# Patient Record
Sex: FEMALE | Race: WHITE | Employment: OTHER | URBAN - METROPOLITAN AREA
[De-identification: names, ages, dates, MRNs, and addresses within clinical notes are randomized per-mention and may not be internally consistent; named-entity substitution may affect disease eponyms.]

---

## 2018-03-08 ENCOUNTER — HOSPITAL ENCOUNTER (OUTPATIENT)
Dept: MAMMOGRAPHY | Age: 68
Discharge: HOME | End: 2018-03-08
Attending: FAMILY MEDICINE
Payer: MEDICARE

## 2018-03-08 DIAGNOSIS — M85.80 OSTEOPENIA: ICD-10-CM

## 2018-03-08 PROCEDURE — 77080 DXA BONE DENSITY AXIAL: CPT

## 2019-03-06 ENCOUNTER — HOSPITAL ENCOUNTER (OUTPATIENT)
Dept: CT IMAGING | Age: 69
Discharge: HOME | End: 2019-03-06
Attending: FAMILY MEDICINE
Payer: SELF-PAY

## 2019-03-06 DIAGNOSIS — Z87.891 PERSONAL HISTORY OF TOBACCO USE, PRESENTING HAZARDS TO HEALTH: ICD-10-CM

## 2019-03-06 PROCEDURE — G0297 LDCT FOR LUNG CA SCREEN: HCPCS

## 2020-01-17 ENCOUNTER — HOSPITAL ENCOUNTER (OUTPATIENT)
Dept: MRI IMAGING | Age: 70
Discharge: HOME | End: 2020-01-17
Attending: FAMILY MEDICINE
Payer: MEDICARE

## 2020-01-17 DIAGNOSIS — M19.90 DEGENERATIVE JOINT DISEASE: ICD-10-CM

## 2020-01-17 DIAGNOSIS — M43.10 SPONDYLOLISTHESIS, GRADE 2: ICD-10-CM

## 2020-01-17 PROCEDURE — 72148 MRI LUMBAR SPINE W/O DYE: CPT

## 2020-01-30 ENCOUNTER — OFFICE VISIT (OUTPATIENT)
Dept: RHEUMATOLOGY | Age: 70
End: 2020-01-30

## 2020-01-30 VITALS
SYSTOLIC BLOOD PRESSURE: 120 MMHG | HEIGHT: 65 IN | WEIGHT: 129.2 LBS | RESPIRATION RATE: 18 BRPM | TEMPERATURE: 98.2 F | DIASTOLIC BLOOD PRESSURE: 78 MMHG | HEART RATE: 112 BPM | OXYGEN SATURATION: 97 % | BODY MASS INDEX: 21.52 KG/M2

## 2020-01-30 DIAGNOSIS — M47.817 LUMBAR AND SACRAL OSTEOARTHRITIS: Primary | ICD-10-CM

## 2020-01-30 DIAGNOSIS — R76.8 POSITIVE ANA (ANTINUCLEAR ANTIBODY): ICD-10-CM

## 2020-01-30 DIAGNOSIS — R70.0 ELEVATED SED RATE: ICD-10-CM

## 2020-01-30 RX ORDER — GLUCOSAMINE/CHONDR SU A SOD 750-600 MG
TABLET ORAL
COMMUNITY

## 2020-01-30 RX ORDER — MELATONIN
DAILY
COMMUNITY

## 2020-01-30 NOTE — PATIENT INSTRUCTIONS
Please fill out your [address] you will receive after your visit in the mail or via [address] Positive JOSE does NOT equal Lupus. You do not have a clinical history or examination consistent with an autoimmune disease, such as systemic lupus erythematosus, at this time. Low positive JOSE 1:40 and 1:160 may be positive in up to 32% and 5%, respectively, in the normal population [facility] et al. Arthritis Res Ther. 2008;10(6):R131. Epub 2008 Nov 11). A history of thyroid disease in the family or thyroid disease in the patient can also cause a positive JOSE. Viral infections, malignancy and medications can also cause a positive JOSE. Anti-RNP has been found to false positives using [facility], therefore clinical correlation is required. Diagnosing systemic lupus is complicated in the sense that you need to look at the entire picture of a patient's presentation and their laboratory workup. In this video, Dr. Frank Moser with the [facility], discusses the process of diagnosing Lupus. [email or web address]

## 2020-01-30 NOTE — PROGRESS NOTES
REASON FOR VISIT    This is the initial evaluation for Ms. Linnea Garcia a 81 y.o.  female for question of positive JOSE. The patient is referred to the [facility] at the request of PCP. HISTORY OF PRESENT ILLNESS     Previous medical records reviewed and summarized: yes    mHAQ:0  Pain scale: 10/10    This is a 81 y.o. female with hx of COPD. The patient notes she has had chronic back issues for a long time. The patient's ESR is going up and down as well. She also has a positive JOSE. Per the patient she had had a positive JOSE since she was 22. The patient notes she has had lower back pain for years. She has had x-rays which show DJD. She had an MRI recently which showed DJD as well. The pain is there all the time but the severity varies. If she cleans the house she gets pain. Strenuous activities makes the pain worse. Rest doesn't always help. The pain doesn't wake her up from sleep. She has stiffness in the morning for a hour or so but mostly the pain gets worse by the end of the day. She uses tramadol when she takes it. She can go for Days without taking anything. Now she is having some problem with some tingling in her left foot. She has done PT, heat etc.  Sometimes when she does PT, it helps. REVIEW OF SYSTEMS    A 15 point review of systems was performed and summarized below. The questionnaire was reviewed with the patient and scanned into the patient's medical record.     General:  denies recent weight gain, recent weight loss, fatigue, weakness, fever, drenching night sweats  Musculoskeletal:  denies joint pain, joint swelling, morning stiffness , muscle pain  Ears: denies ringing in ears, hearing loss, deafness  Eyes: denies pain, light sensitive, redness, blindness, double vision, blurred vision, excess tearing, dryness, foreign body sensation  Mouth: denies sore tongue, oral ulcers, loss of taste, dryness, increased dental caries  Nose: denies nosebleeds, nasal ulcers  Throat: denies food stuck when swallowing, difficulty with swallowing, hoarseness, pain in jaw while chewing  Neck: denies swollen glands, tender glands  Cardiopulmonary: endorses  shortness of breath on exertion, denies pain in chest with deep breaths, pain in chest when lying down, murmurs, sudden changes in heart beat, wheezing, dry cough, productive cough, shortness of breath at rest,coughing of blood  Gastrointestinal: denies nausea, heartburn, stomach pain relieved by food, chronic constipation, chronic diarrhea, blood in stools, black stools  Genitourinary: denies vaginal dryness, pain or burning on urination, blood in urine, cloudy urine, vaginal ulcers, penile ulcers  Hematologic: denies anemia, bleeding tendency, blood clots, bleeding gums  Skin: denies easy bruising, hair loss, rash, rash worsened after sun exposure, hives/urticaria, skin thickening, skin tightness, nodules/bumps, color changes of hands or feet in the cold (Raynaud's)  Neurologic: endorses numbness or tingling in feet, denies numbness or tingling in hands, muscle weakness  Psychiatric: denies depression, excessive worries, PTSD, Bipolar  Sleep: endorses poor sleep (3-4 hours), difficulty falling asleep, difficulty staying asleep denies  denies snoring, apnea, daytime somnolence,     PAST MEDICAL HISTORY    Past Medical History:   Diagnosis Date    Fracture     Right foot fx; patient fell    IBS (irritable bowel syndrome)     Psychiatric disorder     depression, anxiety        Past Surgical History:   Procedure Laterality Date    HX CHOLECYSTECTOMY      HX GYN      hysterectomy       FAMILY HISTORY    History reviewed. No pertinent family history.     SOCIAL HISTORY    Social History     Tobacco Use    Smoking status: Former Smoker     Packs/day: 1.00    Smokeless tobacco: Never Used   Substance Use Topics    Alcohol use: No    Drug use: Never       MEDICATIONS    Current Outpatient Medications   Medication Sig Dispense Refill    AZITHROMYCIN PO Take  by mouth.  cholecalciferol (VITAMIN D3) (1000 Units /25 mcg) tablet Take  by mouth daily.  Biotin 2,500 mcg cap Take  by mouth.  lurasidone (LATUDA) 40 mg tab tablet Take 20 mg by mouth daily (with dinner).  levothyroxine (SYNTHROID) 75 mcg tablet Take 75 mcg by mouth Daily (before breakfast).  lamoTRIgine (LAMICTAL) 150 mg tablet Take 150 mg by mouth daily.  lovastatin (MEVACOR) 20 mg tablet Take 20 mg by mouth daily.  therapeutic multivitamin (THERAGRAN) tablet Take 1 Tab by mouth daily (after lunch).  calcium carbonate (OS-KANDI) 500 mg calcium (1,250 mg) tablet Take 1 Tab by mouth daily (after lunch).  vortioxetine (TRINTELLIX) 20 mg tablet Take 20 mg by mouth daily (with dinner).  LEVOMEFOLATE CALCIUM (DEPLIN PO) Take  by mouth daily. Dose unknown      clorazepate (TRANXENE) 7.5 mg tablet Take 3.75 mg by mouth two (2) times daily as needed for Anxiety.  psyllium (METAMUCIL) packet Take 1 Packet by mouth every other day.  codeine sulfate 30 mg tablet Take 30 mg by mouth every eight (8) hours as needed. ALLERGIES    Allergies   Allergen Reactions    Morphine Itching     Tolerates codeine, hydrocodone, hydromorphone    Advil [Ibuprofen] Other (comments)     BLE 4+ edema. Tolerates Aleve without BLE edema    Bactrim [Sulfamethoprim Ds] Other (comments)     Stomach burning    Keflex [Cephalexin] Diarrhea and Nausea and Vomiting     Tolerates PCNs    Macrobid [Nitrofurantoin Monohyd/M-Cryst] Diarrhea and Nausea and Vomiting    Macrodantin [Nitrofurantoin Macrocrystalline] Diarrhea and Nausea and Vomiting    Klonopin [Clonazepam] Other (comments)     Felt hung over.   Stopped on Monday 6/27/2016       PHYSICAL EXAMINATION    Visit Vitals  /78 (BP 1 Location: Right arm, BP Patient Position: Sitting)   Pulse (!) 112   Temp 98.2 °F (36.8 °C) (Oral)   Resp 18   Ht 5' 4.5\" (1.638 m)   Wt 129 lb 3.2 oz (58.6 kg)   SpO2 97%   BMI 21.83 kg/m²     Body mass index is 21.83 kg/m². General: NAD  HEENT: PERRL, anicteric, non-injected sclerae; oropharynx without ulcers, erythema, or exudate. Moist mucous membranes. Lymphatic: No cervical or axillary lymphadenopathy. Cardiovascular: S1, S2,no R/M/G  Pulmonary: CTA b/l. No wheezes/rales/rhonchi. Abdominal: Soft,NTND, + BS. Skin: No rash, nodules, or periungual changes. Neuro: Alert; able to carry normal conversation    Musculoskeletal:   B/l heberden and bouchards nodes    DATA REVIEW    Prior medical records were reviewed and if applicable are summarized as below:    Labs:   N/A    Imaging:   N/A    ASSESSMENT AND PLAN    A 81 y.o. female with hx of COPD, lumbar DJD presents for evaluation of back pain, positive JOSE and elevated Esr. The patient's back pin is due to her lumbar DJD. She does not have features of inflammatory pain. She has had a positive JOSE since her 25s and not developed any autoimmune diseases and even now she does not have symptoms of any autoimmune diseases. Given this, her JOSE is likely not clinically significant and does not need to be checked again. # Lumber DJD:  - tramadol PRN  - patient to get PT referral if needed from PCP    # Positive JOSE:  - no further work up    # Elevated ESR:   - no further work up as this can be high from many causes including age    RTC in PRN    The patient voiced understanding of the aforementioned assessment and plan. Summary of plan was provided in the After Visit Summary patient instructions. I also provided education about MyChart setup and utility. Ms. Bud Fierro has a reminder for a \"due or due soon\" health maintenance. I have asked that she contact her primary care provider for follow-up on this health maintenance.     TODAY'S ORDERS    Orders Placed This Encounter    AZITHROMYCIN PO    cholecalciferol (VITAMIN D3) (1000 Units /25 mcg) tablet    Biotin 2,500 mcg cap       No future appointments.     Noelle Kang MD    Adult Rheumatology   [facility]  A Part of [facility] (formerly [facility]3]  [id number] [place] [address], [place], [address]   Phone [phone]  Fax [phone]

## 2020-05-18 NOTE — PROGRESS NOTES
[facility] [place] [facility] [place] [address]  [address] [facility], [address] [place] [address] [address] [facility]  Danita Mix: [phone]  F: [phone]      Reason for Visit:   Matt Sánchez is a 81 y.o. female who is seen by synchronous (real-time) audio-video technology in consultation at the request of Dr. Hiro Heller for evaluation of anemia and thrombocytopenia. History of Present Illness:   Matt Sánchez is a pleasant 81 y.o. female who presents today for evaluation of anemia and thrombocytopenia. She went to see her PCP earlier this month because she wasn't feeling well with some low-grade fevers (99.8), fatigue, and aches. She was tested for COVID and this was negative. He did labwork as well which revealed thrombocytopenia and anemia. She reports this was repeated and confirmed the findings, prompting her referral to see me. Her symptoms improved spontaneously. She reports this is her first time having issues with low blood counts. She denies family history of hematologic issues. She denies bleeding, other than some occasional gum bleeding in the past few months when brushing her teeth. Bruises easily on arms, chronic. She reports a healthy diet, well rounded. Also drinking boost and glucerna. Takes an \"organic\" multivitamin. Denies ASA use. Denies history of problems with her liver or spleen. Denies etoh. She reports her last colonoscopy was about a year and this was ok. EGD was about 5-6 years ago and this was ok. She notes a chronic positive JOSE, though she denies any history of autoimmune disease. She is unaccompanied on the video call today. She is a retired nurse.       Past Medical History:   Diagnosis Date    COPD (chronic obstructive pulmonary disease) ([facility] [address]     Fracture     Right foot fx; patient fell    IBS (irritable bowel syndrome)     Psychiatric disorder     depression, anxiety      Past Surgical History:   Procedure Laterality Date    HX CHOLECYSTECTOMY      HX GYN      hysterectomy      Social History     Tobacco Use    Smoking status: Former Smoker     Packs/day: 1.00    Smokeless tobacco: Never Used   Substance Use Topics    Alcohol use: No      No family history on file. Current Outpatient Medications   Medication Sig    AZITHROMYCIN PO Take  by mouth. 3x a week for copd    cholecalciferol (VITAMIN D3) (1000 Units /25 mcg) tablet Take  by mouth daily.  Biotin 2,500 mcg cap Take  by mouth.  lurasidone (LATUDA) 40 mg tab tablet Take 20 mg by mouth daily (with dinner).  levothyroxine (SYNTHROID) 75 mcg tablet Take 75 mcg by mouth Daily (before breakfast).  lamoTRIgine (LAMICTAL) 150 mg tablet Take 150 mg by mouth daily.  LEVOMEFOLATE CALCIUM (DEPLIN PO) Take  by mouth daily. Dose unknown    clorazepate (TRANXENE) 7.5 mg tablet Take 3.75 mg by mouth two (2) times daily as needed for Anxiety.  lovastatin (MEVACOR) 20 mg tablet Take 20 mg by mouth daily.  therapeutic multivitamin (THERAGRAN) tablet Take 1 Tab by mouth daily (after lunch).  calcium carbonate (OS-KANDI) 500 mg calcium (1,250 mg) tablet Take 1 Tab by mouth daily (after lunch). No current facility-administered medications for this visit. Allergies   Allergen Reactions    Morphine Itching     Tolerates codeine, hydrocodone, hydromorphone    Advil [Ibuprofen] Other (comments)     BLE 4+ edema. Tolerates Aleve without BLE edema    Bactrim [Sulfamethoprim Ds] Other (comments)     Stomach burning    Keflex [Cephalexin] Diarrhea and Nausea and Vomiting     Tolerates PCNs    Macrobid [Nitrofurantoin Monohyd/M-Cryst] Diarrhea and Nausea and Vomiting    Macrodantin [Nitrofurantoin Macrocrystalline] Diarrhea and Nausea and Vomiting    Klonopin [Clonazepam] Other (comments)     Felt hung over.   Stopped on Monday 6/27/2016    Sulfamethoxazole-Trimethoprim Nausea and Vomiting        Review of Systems: A complete review of systems was obtained, negative except as described above.    Physical Exam:   There were no vitals taken for this visit. [INSTRUCTIONS:  \"[x]\" Indicates a positive item  \"[]\" Indicates a negative item  -- DELETE ALL ITEMS NOT EXAMINED]    Constitutional: [x] Appears well-developed and well-nourished [x] No apparent distress      [] Abnormal -     Mental status: [x] Alert and awake  [x] Oriented to person/place/time [x] Able to follow commands    [] Abnormal -     Eyes:   EOM    [x]  Normal    [] Abnormal -   Sclera  [x]  Normal    [] Abnormal -          Discharge [x]  None visible   [] Abnormal -     HENT: [x] Normocephalic, atraumatic  [] Abnormal -   [x] Mouth/Throat: Mucous membranes are moist    External Ears [x] Normal  [] Abnormal -    Neck: [x] No visualized mass [] Abnormal -     Pulmonary/Chest: [x] Respiratory effort normal   [x] No visualized signs of difficulty breathing or respiratory distress        [] Abnormal -      Musculoskeletal:   [x] Normal gait with no signs of ataxia         [x] Normal range of motion of neck        [] Abnormal -     Neurological:        [x] No Facial Asymmetry (Cranial nerve 7 motor function) (limited exam due to video visit)          [x] No gaze palsy        [] Abnormal -          Skin:        [x] No significant exanthematous lesions or discoloration noted on facial skin         [] Abnormal -            Psychiatric:       [x] Normal Affect [] Abnormal -        [x] No Hallucinations    Other pertinent observable physical exam findings:-    Due to this being a TeleHealth evaluation (During BTUSQ-44 public health emergency), many elements of the physical examination are unable to be assessed. Evaluation of the following organ systems was limited: Vitals/Constitutional/EENT/Resp/CV/GI//MS/Neuro/Skin/Heme-Lymph-Imm.     Results:     Lab Results   Component Value Date/Time    WBC 9.5 06/29/2016 10:46 AM    HGB 15.3 06/29/2016 10:46 AM    HCT 43.8 06/29/2016 10:46 AM    PLATELET 027 05/26/6605 10:46 AM    MCV 93.4 06/29/2016 10:46 AM    ABS. NEUTROPHILS 7.4 06/29/2016 10:46 AM     Lab Results   Component Value Date/Time    Sodium 142 06/29/2016 10:46 AM    Potassium 3.4 (L) 06/29/2016 10:46 AM    Chloride 106 06/29/2016 10:46 AM    CO2 27 06/29/2016 10:46 AM    Glucose 131 (H) 06/29/2016 10:46 AM    BUN 9 06/29/2016 10:46 AM    Creatinine 0.96 06/29/2016 10:46 AM    GFR est AA >60 06/29/2016 10:46 AM    GFR est non-AA 59 (L) 06/29/2016 10:46 AM    Calcium 9.3 06/29/2016 10:46 AM     Lab Results   Component Value Date/Time    Bilirubin, total 0.3 06/29/2016 10:46 AM    ALT (SGPT) 24 06/29/2016 10:46 AM    AST (SGOT) 21 06/29/2016 10:46 AM    Alk. phosphatase 68 06/29/2016 10:46 AM    Protein, total 7.7 06/29/2016 10:46 AM    Albumin 4.3 06/29/2016 10:46 AM    Globulin 3.4 06/29/2016 10:46 AM     Lab Results   Component Value Date/Time    TSH 0.30 (L) 06/29/2016 10:46 AM         8/15/2019  WBC: 5.8  HGB: 12.9  PLT: 138    9/5/2019  WBC: 5.2  HGB: 12.4  PLT: 128    5/1/2020  WBC: 5.2  HGB: 11.6  PLT: 65    US abdomen 7/13/2015:  Liver and spleen wnl    Records reviewed and summarized above. Assessment:   1) Thrombocytopenia  Moderate, with platelets down to 65. Progressive over the past year. The differential diagnosis for thrombocytopenia is broad, and can be generally broken down into three groups: Decreased Production, Increased Consumption, or Pseudothrombocytopenia. Causes of decreased production include infection, HIV, etoh toxicity, B12 and folate deficiency, and primary bone marrow disorders. Causes of increased consumption include various drugs, DIC, TTP, ITP, APLA, splenomegaly, and physical destruction related to valvular disease, aneurysms, etc.  Pseudothrombocytopenia is a laboratory artifact and can be ruled out by a smear review. I will start by obtaining some additional labs to further evaluate.     We may want to consider repeating her abdominal US to evaluate the liver and spleen, but given the pandemic, we will wait and see what her labs show first.    If her labwork is unremarkable and thrombocytopenia is not improving, we may need to consider a bone marrow biopsy. 2) Anemia  Mild, with HGB of 11.6. New compared to last year. Labs to evaluate. Plan:     · Labs sometime soon  · Return to see me next week to review results    I appreciate the opportunity to participate in Ms. Vilma Calderon's care. Signed By: Kristine Moran MD      I was in the office while conducting this encounter. The patient was at her home. Consent:  She and/or her healthcare decision maker is aware that this patient-initiated Telehealth encounter is a billable service, with coverage as determined by her insurance carrier. She is aware that she may receive a bill and has provided verbal consent to proceed: Yes    Pursuant to the emergency declaration under the [employer] and the [facility], 1135 waiver authority and the [employer] and [employer] Act, this Virtual  Visit was conducted, with patient's (and/or legal guardian's) consent, to reduce the patient's risk of exposure to COVID-19 and provide necessary medical care. Services were provided through a video synchronous discussion virtually to substitute for in-person clinic visit.

## 2020-05-19 ENCOUNTER — VIRTUAL VISIT (OUTPATIENT)
Dept: ONCOLOGY | Age: 70
End: 2020-05-19

## 2020-05-19 DIAGNOSIS — D69.6 THROMBOCYTOPENIA (HCC): ICD-10-CM

## 2020-05-19 DIAGNOSIS — D64.9 ANEMIA, UNSPECIFIED TYPE: Primary | ICD-10-CM

## 2020-05-20 ENCOUNTER — HOSPITAL ENCOUNTER (OUTPATIENT)
Dept: LAB | Age: 70
Discharge: HOME | End: 2020-05-20

## 2020-05-20 DIAGNOSIS — D69.6 THROMBOCYTOPENIA (HCC): ICD-10-CM

## 2020-05-20 DIAGNOSIS — D64.9 ANEMIA, UNSPECIFIED TYPE: ICD-10-CM

## 2020-05-20 LAB
ALBUMIN SERPL-MCNC: 4.2 G/DL (ref 3.5–5)
ALBUMIN/GLOB SERPL: 1.4 {RATIO} (ref 1.1–2.2)
ALP SERPL-CCNC: 79 U/L (ref 45–117)
ALT SERPL-CCNC: 27 U/L (ref 12–78)
APTT PPP: 22.2 SEC (ref 22.1–32)
AST SERPL-CCNC: 28 U/L (ref 15–37)
BASOPHILS # BLD: 0.1 K/UL (ref 0–0.1)
BASOPHILS NFR BLD: 1 % (ref 0–1)
BILIRUB DIRECT SERPL-MCNC: <0.1 MG/DL (ref 0–0.2)
BILIRUB SERPL-MCNC: 0.3 MG/DL (ref 0.2–1)
COMMENT, HOLDF: NORMAL
DIFFERENTIAL METHOD BLD: ABNORMAL
EOSINOPHIL # BLD: 0.2 K/UL (ref 0–0.4)
EOSINOPHIL NFR BLD: 5 % (ref 0–7)
ERYTHROCYTE [DISTWIDTH] IN BLOOD BY AUTOMATED COUNT: 12.6 % (ref 11.5–14.5)
FERRITIN SERPL-MCNC: 31 NG/ML (ref 26–388)
FIBRINOGEN PPP-MCNC: 376 MG/DL (ref 200–475)
FOLATE SERPL-MCNC: 16.7 NG/ML (ref 5–21)
GLOBULIN SER CALC-MCNC: 3 G/DL (ref 2–4)
HAPTOGLOB SERPL-MCNC: 155 MG/DL (ref 30–200)
HCT VFR BLD AUTO: 35.4 % (ref 35–47)
HCV AB SERPL QL IA: NONREACTIVE
HCV COMMENT,HCGAC: NORMAL
HGB BLD-MCNC: 12 G/DL (ref 11.5–16)
HIV 1+2 AB+HIV1 P24 AG SERPL QL IA: NONREACTIVE
HIV12 RESULT COMMENT, HHIVC: NORMAL
IMM GRANULOCYTES # BLD AUTO: 0 K/UL (ref 0–0.04)
IMM GRANULOCYTES NFR BLD AUTO: 0 % (ref 0–0.5)
INR PPP: 1 (ref 0.9–1.1)
IRON SATN MFR SERPL: 22 % (ref 20–50)
IRON SERPL-MCNC: 74 UG/DL (ref 35–150)
LDH SERPL L TO P-CCNC: 292 U/L (ref 81–246)
LYMPHOCYTES # BLD: 1.2 K/UL (ref 0.8–3.5)
LYMPHOCYTES NFR BLD: 25 % (ref 12–49)
MCH RBC QN AUTO: 33.1 PG (ref 26–34)
MCHC RBC AUTO-ENTMCNC: 33.9 G/DL (ref 30–36.5)
MCV RBC AUTO: 97.8 FL (ref 80–99)
MONOCYTES # BLD: 0.4 K/UL (ref 0–1)
MONOCYTES NFR BLD: 9 % (ref 5–13)
NEUTS SEG # BLD: 3 K/UL (ref 1.8–8)
NEUTS SEG NFR BLD: 60 % (ref 32–75)
NRBC # BLD: 0 K/UL (ref 0–0.01)
NRBC BLD-RTO: 0 PER 100 WBC
PLATELET # BLD AUTO: 69 K/UL (ref 150–400)
PMV BLD AUTO: 11.5 FL (ref 8.9–12.9)
PROT SERPL-MCNC: 7.2 G/DL (ref 6.4–8.2)
PROTHROMBIN TIME: 9.9 SEC (ref 9–11.1)
RBC # BLD AUTO: 3.62 M/UL (ref 3.8–5.2)
RBC MORPH BLD: ABNORMAL
RETICS # AUTO: 0.08 M/UL (ref 0.02–0.08)
RETICS/RBC NFR AUTO: 2.2 % (ref 0.7–2.1)
SAMPLES BEING HELD,HOLD: NORMAL
THERAPEUTIC RANGE,PTTT: NORMAL SECS (ref 58–77)
TIBC SERPL-MCNC: 329 UG/DL (ref 250–450)
VIT B12 SERPL-MCNC: 820 PG/ML (ref 193–986)
WBC # BLD AUTO: 4.9 K/UL (ref 3.6–11)

## 2020-05-21 LAB
ALBUMIN SERPL ELPH-MCNC: 4 G/DL (ref 2.9–4.4)
ALBUMIN/GLOB SERPL: 1.4 {RATIO} (ref 0.7–1.7)
ALPHA1 GLOB SERPL ELPH-MCNC: 0.2 G/DL (ref 0–0.4)
ALPHA2 GLOB SERPL ELPH-MCNC: 0.8 G/DL (ref 0.4–1)
ANA SER QL: POSITIVE
B-GLOBULIN SERPL ELPH-MCNC: 1 G/DL (ref 0.7–1.3)
CENTROMERE B AB SER-ACNC: <0.2 AI (ref 0–0.9)
CHROMATIN AB SERPL-ACNC: <0.2 AI (ref 0–0.9)
DSDNA AB SER-ACNC: 1 IU/ML (ref 0–9)
ENA JO1 AB SER-ACNC: <0.2 AI (ref 0–0.9)
ENA RNP AB SER-ACNC: 0.3 AI (ref 0–0.9)
ENA SCL70 AB SER-ACNC: <0.2 AI (ref 0–0.9)
ENA SM AB SER-ACNC: <0.2 AI (ref 0–0.9)
ENA SS-A AB SER-ACNC: 3.1 AI (ref 0–0.9)
ENA SS-B AB SER-ACNC: <0.2 AI (ref 0–0.9)
GAMMA GLOB SERPL ELPH-MCNC: 0.8 G/DL (ref 0.4–1.8)
GLOBULIN SER-MCNC: 2.9 G/DL (ref 2.2–3.9)
H PYLORI IGA SER-ACNC: <9 UNITS (ref 0–8.9)
H PYLORI IGG SER IA-ACNC: 0.13 INDEX VALUE (ref 0–0.79)
H PYLORI IGM SER-ACNC: <9 UNITS (ref 0–8.9)
IGA SERPL-MCNC: 211 MG/DL (ref 87–352)
IGG SERPL-MCNC: 921 MG/DL (ref 586–1602)
IGM SERPL-MCNC: 191 MG/DL (ref 26–217)
INTERPRETATION SERPL IEP-IMP: NORMAL
M PROTEIN SERPL ELPH-MCNC: NORMAL G/DL
PERIPHERAL SMEAR,PSM: NORMAL
PROT SERPL-MCNC: 6.9 G/DL (ref 6–8.5)
SEE BELOW, 164869: ABNORMAL

## 2020-05-22 NOTE — PROGRESS NOTES
[facility] [place] at [address] [facility]  Ash Sender: [phone]  F: [phone]      Reason for Visit:   Kaylen Summers is a 81 y.o. female who is seen by synchronous (real-time) audio-video technology for follow up of anemia and thrombocytopenia. History of Present Illness:   She returns today to review her test results. She continues with easy bruising. Energy low but stable, but she wonders if she may be depressed by the pandemic, planning to see psych soon. She is unaccompanied on the video call today. She is a retired nurse. PAST HISTORY: The following sections were reviewed and updated in the EMR as appropriate: PMH, SH, FH, Medications, Allergies. Allergies   Allergen Reactions    Morphine Itching     Tolerates codeine, hydrocodone, hydromorphone    Advil [Ibuprofen] Other (comments)     BLE 4+ edema. Tolerates Aleve without BLE edema    Bactrim [Sulfamethoprim Ds] Other (comments)     Stomach burning    Keflex [Cephalexin] Diarrhea and Nausea and Vomiting     Tolerates PCNs    Macrobid [Nitrofurantoin Monohyd/M-Cryst] Diarrhea and Nausea and Vomiting    Macrodantin [Nitrofurantoin Macrocrystalline] Diarrhea and Nausea and Vomiting    Klonopin [Clonazepam] Other (comments)     Felt hung over. Stopped on Monday 6/27/2016    Sulfamethoxazole-Trimethoprim Nausea and Vomiting      Review of Systems: A complete review of systems was obtained, reviewed. Pertinent findings reviewed above. Physical Exam:   There were no vitals taken for this visit.   General: alert, cooperative, no distress   Mental  status: normal mood, behavior, speech, dress, motor activity, and thought processes, able to follow commands   HENT: NCAT   Neck: no visualized mass   Resp: no respiratory distress   Neuro: no gross deficits   Skin: no discoloration or lesions of concern on visible areas   Psychiatric: normal affect, consistent with stated mood, no evidence of hallucinations       Due to this being a TeleHealth evaluation (During [address] emergency), many elements of the physical examination are unable to be assessed. Evaluation of the following organ systems was limited: Vitals/Constitutional/EENT/Resp/CV/GI//MS/Neuro/Skin/Heme-Lymph-Imm. Results:     Lab Results   Component Value Date/Time    WBC 4.9 05/20/2020 01:04 PM    HGB 12.0 05/20/2020 01:04 PM    HCT 35.4 05/20/2020 01:04 PM    PLATELET 69 (L) 02/43/7369 01:04 PM    MCV 97.8 05/20/2020 01:04 PM    ABS. NEUTROPHILS 3.0 05/20/2020 01:04 PM     Lab Results   Component Value Date/Time    Sodium 142 06/29/2016 10:46 AM    Potassium 3.4 (L) 06/29/2016 10:46 AM    Chloride 106 06/29/2016 10:46 AM    CO2 27 06/29/2016 10:46 AM    Glucose 131 (H) 06/29/2016 10:46 AM    BUN 9 06/29/2016 10:46 AM    Creatinine 0.96 06/29/2016 10:46 AM    GFR est AA >60 06/29/2016 10:46 AM    GFR est non-AA 59 (L) 06/29/2016 10:46 AM    Calcium 9.3 06/29/2016 10:46 AM     Lab Results   Component Value Date/Time    Bilirubin, total 0.3 05/20/2020 01:04 PM    ALT (SGPT) 27 05/20/2020 01:04 PM    AST (SGOT) 28 05/20/2020 01:04 PM    Alk. phosphatase 79 05/20/2020 01:04 PM    Protein, total 7.2 05/20/2020 01:04 PM    Protein, total 6.9 05/20/2020 01:04 PM    Albumin 4.2 05/20/2020 01:04 PM    Globulin 3.0 05/20/2020 01:04 PM     Lab Results   Component Value Date/Time    Reticulocyte count 2.2 (H) 05/20/2020 01:04 PM    Iron % saturation 22 05/20/2020 01:04 PM    TIBC 329 05/20/2020 01:04 PM    Ferritin 31 05/20/2020 01:04 PM    Vitamin B12 820 05/20/2020 01:04 PM    Folate 16.7 05/20/2020 01:04 PM    Haptoglobin 155 05/20/2020 01:04 PM     (H) 05/20/2020 01:04 PM    TSH 0.30 (L) 06/29/2016 10:46 AM    M-Gabriele Not Observed 05/20/2020 01:04 PM    JOSE, Direct Positive (A) 05/20/2020 01:04 PM    Hep C  virus Ab Interp.  NONREACTIVE 05/20/2020 01:01 PM     PLATELET (K/uL)   Date Value   05/20/2020 69 (L)   06/29/2016 177   05/11/2010 252           8/15/2019  WBC: 5.8  HGB: 12.9  PLT: 138    9/5/2019  WBC: 5.2  HGB: 12.4  PLT: 128    5/1/2020  WBC: 5.2  HGB: 11.6  PLT: 65    5/20/2020:  Smear: Thrombocytopenia. Normochromic normocytic red blood cells. Unremarkable white blood cells. HPylori ab: negative  MARILYN: IgG monoclonal protein with kappa light chain specificity    US abdomen 7/13/2015:  Liver and spleen wnl    Records reviewed and summarized above. Assessment:   1) Thrombocytopenia  Moderate, with platelets of 69 on recheck. Progressive over the past year. Extensive laboratory evaluation has been fairly unremarkable, other than a positive JOSE and MARILYN. I suspect this may be chronic ITP, but I recommend we obtain a bone marrow biopsy to further evaluate, especially in light of the monoclonal gammopathy. I also recommend an ultrasound to evaluate for splenomegaly. 2) Anemia  Resolved. HGB 12.0 on recheck. 3) MGUS  MARILYN with monoclonal IgG, though M-spike is negative. I recommend we check serum free light chains and 24 hour UPEP. Given thrombocytopenia, will also get a bone marrow biopsy. Plan:     · Labs: Repeat SPEP/MARILYN, check SFLC and 24 hour UPEP ([facility] lab)  · US abdomen  · Bone marrow biopsy  · Return to see me to review results    Signed By: Patel Ordoñez MD      I was in the office while conducting this encounter. The patient was at her home. Consent:  She and/or her healthcare decision maker is aware that this patient-initiated Telehealth encounter is a billable service, with coverage as determined by her insurance carrier.  She is aware that she may receive a bill and has provided verbal consent to proceed: Yes    Pursuant to the emergency declaration under the [address] and the [facility] 1135 waiver authority and the [employer] Act, this Virtual  Visit was conducted, with patient's (and/or legal guardian's) consent, to reduce the patient's risk of exposure to COVID-19 and provide necessary medical care. Services were provided through a video synchronous discussion virtually to substitute for in-person clinic visit.

## 2020-05-26 ENCOUNTER — VIRTUAL VISIT (OUTPATIENT)
Dept: ONCOLOGY | Age: 70
End: 2020-05-26

## 2020-05-26 DIAGNOSIS — D47.2 MGUS (MONOCLONAL GAMMOPATHY OF UNKNOWN SIGNIFICANCE): ICD-10-CM

## 2020-05-26 DIAGNOSIS — D69.6 THROMBOCYTOPENIA (HCC): Primary | ICD-10-CM

## 2020-05-26 DIAGNOSIS — D64.9 ANEMIA, UNSPECIFIED TYPE: ICD-10-CM

## 2020-05-26 NOTE — PROGRESS NOTES
Radha Giles is a 81 y.o. female follow up for anemia and thrombocytopenia. 1. Have you been to the ER, urgent care clinic since your last visit? Hospitalized since your last visit?no     2. Have you seen or consulted any other health care providers outside of the [address] [place] [address] since your last visit? Include any pap smears or colon screening.  no

## 2020-05-28 ENCOUNTER — HOSPITAL ENCOUNTER (OUTPATIENT)
Dept: ULTRASOUND IMAGING | Age: 70
Discharge: HOME | End: 2020-05-28
Attending: INTERNAL MEDICINE
Payer: MEDICARE

## 2020-05-28 DIAGNOSIS — D64.9 ANEMIA, UNSPECIFIED TYPE: ICD-10-CM

## 2020-05-28 DIAGNOSIS — D69.6 THROMBOCYTOPENIA (HCC): ICD-10-CM

## 2020-05-28 PROCEDURE — 76700 US EXAM ABDOM COMPLETE: CPT

## 2020-06-04 ENCOUNTER — HOSPITAL ENCOUNTER (OUTPATIENT)
Dept: CT IMAGING | Age: 70
Discharge: HOME | End: 2020-06-04
Attending: INTERNAL MEDICINE
Payer: MEDICARE

## 2020-06-04 VITALS
RESPIRATION RATE: 18 BRPM | HEART RATE: 90 BPM | OXYGEN SATURATION: 99 % | DIASTOLIC BLOOD PRESSURE: 70 MMHG | TEMPERATURE: 98.8 F | SYSTOLIC BLOOD PRESSURE: 110 MMHG | WEIGHT: 133 LBS | HEIGHT: 64 IN | BODY MASS INDEX: 22.71 KG/M2

## 2020-06-04 DIAGNOSIS — D64.9 ANEMIA, UNSPECIFIED TYPE: ICD-10-CM

## 2020-06-04 DIAGNOSIS — D47.2 MGUS (MONOCLONAL GAMMOPATHY OF UNKNOWN SIGNIFICANCE): ICD-10-CM

## 2020-06-04 DIAGNOSIS — D69.6 THROMBOCYTOPENIA (HCC): ICD-10-CM

## 2020-06-04 LAB
BASOPHILS # BLD: 0 K/UL (ref 0–0.1)
BASOPHILS NFR BLD: 1 % (ref 0–1)
DIFFERENTIAL METHOD BLD: ABNORMAL
EOSINOPHIL # BLD: 0.2 K/UL (ref 0–0.4)
EOSINOPHIL NFR BLD: 5 % (ref 0–7)
ERYTHROCYTE [DISTWIDTH] IN BLOOD BY AUTOMATED COUNT: 12.8 % (ref 11.5–14.5)
HCT VFR BLD AUTO: 34.6 % (ref 35–47)
HGB BLD-MCNC: 11.6 G/DL (ref 11.5–16)
IMM GRANULOCYTES # BLD AUTO: 0 K/UL (ref 0–0.04)
IMM GRANULOCYTES NFR BLD AUTO: 0 % (ref 0–0.5)
LYMPHOCYTES # BLD: 1.3 K/UL (ref 0.8–3.5)
LYMPHOCYTES NFR BLD: 31 % (ref 12–49)
MCH RBC QN AUTO: 32.6 PG (ref 26–34)
MCHC RBC AUTO-ENTMCNC: 33.5 G/DL (ref 30–36.5)
MCV RBC AUTO: 97.2 FL (ref 80–99)
MONOCYTES # BLD: 0.5 K/UL (ref 0–1)
MONOCYTES NFR BLD: 12 % (ref 5–13)
NEUTS SEG # BLD: 2.1 K/UL (ref 1.8–8)
NEUTS SEG NFR BLD: 51 % (ref 32–75)
NRBC # BLD: 0 K/UL (ref 0–0.01)
NRBC BLD-RTO: 0 PER 100 WBC
PLATELET # BLD AUTO: 63 K/UL (ref 150–400)
PMV BLD AUTO: 11.2 FL (ref 8.9–12.9)
RBC # BLD AUTO: 3.56 M/UL (ref 3.8–5.2)
RBC MORPH BLD: ABNORMAL
WBC # BLD AUTO: 4.1 K/UL (ref 3.6–11)

## 2020-06-04 PROCEDURE — 88313 SPECIAL STAINS GROUP 2: CPT

## 2020-06-04 PROCEDURE — 88305 TISSUE EXAM BY PATHOLOGIST: CPT

## 2020-06-04 PROCEDURE — 88342 IMHCHEM/IMCYTCHM 1ST ANTB: CPT

## 2020-06-04 PROCEDURE — 77030014115

## 2020-06-04 PROCEDURE — 88374 M/PHMTRC ALYS ISHQUANT/SEMIQ: CPT

## 2020-06-04 PROCEDURE — 88264 CHROMOSOME ANALYSIS 20-25: CPT

## 2020-06-04 PROCEDURE — 88311 DECALCIFY TISSUE: CPT

## 2020-06-04 PROCEDURE — 88364 INSITU HYBRIDIZATION (FISH): CPT

## 2020-06-04 PROCEDURE — 88365 INSITU HYBRIDIZATION (FISH): CPT

## 2020-06-04 PROCEDURE — 88184 FLOWCYTOMETRY/ TC 1 MARKER: CPT

## 2020-06-04 PROCEDURE — 77030003666 HC NDL SPINAL BD -A

## 2020-06-04 PROCEDURE — 88185 FLOWCYTOMETRY/TC ADD-ON: CPT

## 2020-06-04 PROCEDURE — 38221 DX BONE MARROW BIOPSIES: CPT

## 2020-06-04 PROCEDURE — 85025 COMPLETE CBC W/AUTO DIFF WBC: CPT

## 2020-06-04 PROCEDURE — 77030028872 HC BN BIOP NDL ON CNTRL TY TELE -C

## 2020-06-04 PROCEDURE — 74011250636 HC RX REV CODE- 250/636: Performed by: RADIOLOGY

## 2020-06-04 PROCEDURE — 36415 COLL VENOUS BLD VENIPUNCTURE: CPT

## 2020-06-04 PROCEDURE — 88341 IMHCHEM/IMCYTCHM EA ADD ANTB: CPT

## 2020-06-04 PROCEDURE — 88237 TISSUE CULTURE BONE MARROW: CPT

## 2020-06-04 RX ORDER — SODIUM CHLORIDE 9 MG/ML
25 INJECTION, SOLUTION INTRAVENOUS CONTINUOUS
Status: DISCONTINUED | OUTPATIENT
Start: 2020-06-04 | End: 2020-06-04

## 2020-06-04 RX ORDER — MIDAZOLAM HYDROCHLORIDE 1 MG/ML
5 INJECTION, SOLUTION INTRAMUSCULAR; INTRAVENOUS
Status: DISCONTINUED | OUTPATIENT
Start: 2020-06-04 | End: 2020-06-04

## 2020-06-04 RX ORDER — MIDAZOLAM HYDROCHLORIDE 1 MG/ML
INJECTION, SOLUTION INTRAMUSCULAR; INTRAVENOUS
Status: DISCONTINUED
Start: 2020-06-04 | End: 2020-06-04

## 2020-06-04 RX ORDER — FENTANYL CITRATE 50 UG/ML
100 INJECTION, SOLUTION INTRAMUSCULAR; INTRAVENOUS
Status: DISCONTINUED | OUTPATIENT
Start: 2020-06-04 | End: 2020-06-04

## 2020-06-04 RX ADMIN — MIDAZOLAM HYDROCHLORIDE 1 MG: 2 INJECTION, SOLUTION INTRAMUSCULAR; INTRAVENOUS at 09:39

## 2020-06-04 RX ADMIN — FENTANYL CITRATE 25 MCG: 50 INJECTION INTRAMUSCULAR; INTRAVENOUS at 09:33

## 2020-06-04 RX ADMIN — MIDAZOLAM HYDROCHLORIDE 1 MG: 2 INJECTION, SOLUTION INTRAMUSCULAR; INTRAVENOUS at 09:29

## 2020-06-04 RX ADMIN — MIDAZOLAM HYDROCHLORIDE 1 MG: 2 INJECTION, SOLUTION INTRAMUSCULAR; INTRAVENOUS at 09:23

## 2020-06-04 RX ADMIN — MIDAZOLAM HYDROCHLORIDE 1 MG: 2 INJECTION, SOLUTION INTRAMUSCULAR; INTRAVENOUS at 09:43

## 2020-06-04 RX ADMIN — FENTANYL CITRATE 25 MCG: 50 INJECTION INTRAMUSCULAR; INTRAVENOUS at 09:28

## 2020-06-04 RX ADMIN — FENTANYL CITRATE 25 MCG: 50 INJECTION INTRAMUSCULAR; INTRAVENOUS at 09:38

## 2020-06-04 RX ADMIN — SODIUM CHLORIDE 25 ML/HR: 900 INJECTION, SOLUTION INTRAVENOUS at 08:35

## 2020-06-04 RX ADMIN — FENTANYL CITRATE 25 MCG: 50 INJECTION INTRAMUSCULAR; INTRAVENOUS at 09:24

## 2020-06-04 RX ADMIN — MIDAZOLAM HYDROCHLORIDE 1 MG: 2 INJECTION, SOLUTION INTRAMUSCULAR; INTRAVENOUS at 09:34

## 2020-06-04 NOTE — PROGRESS NOTES
Pt arrives ambulatory to angio department accompanied by  for CT BM BX procedure. All assessments completed and consent was reviewed. Education given was regarding procedure, conscious sedation, post-procedure care and  management/follow-up. Opportunity for questions was provided and all questions and concerns were addressed. Reviewed sedation plan to include medicating under conscious sedation using versed and fentanyl IV. Reviewed potential side effects with patient and possible interactions with patient's current meds Pt educated on moderate sedation and its purpose; medications and side effects described and patient verbalized understanding. Dr. Mendoza Seats in to talk with patient about procedure. Patient assessed and evaluated for procedure. Consent signed.

## 2020-06-04 NOTE — H&P
Interventional Radiology History and Physical (Inpatient)    Patient: Marie Wilcox 81 y.o. female     Referring Physician:  Irma Tapia MD    Chief Complaint: No chief complaint on file. History of Present Illness: conscious sedation (Versed and fentanyl) for bone marrow biopsy    History:  Past Medical History:   Diagnosis Date    COPD (chronic obstructive pulmonary disease) (HCC)     Fracture     Right foot fx; patient fell    IBS (irritable bowel syndrome)     Psychiatric disorder     depression, anxiety     No family history on file.   Social History     Socioeconomic History    Marital status:      Spouse name: Not on file    Number of children: Not on file    Years of education: Not on file    Highest education level: Not on file   Occupational History    Not on file   Social Needs    Financial resource strain: Not on file    Food insecurity     Worry: Not on file     Inability: Not on file    Transportation needs     Medical: Not on file     Non-medical: Not on file   Tobacco Use    Smoking status: Former Smoker     Packs/day: 1.00    Smokeless tobacco: Never Used   Substance and Sexual Activity    Alcohol use: No    Drug use: Never    Sexual activity: Not on file   Lifestyle    Physical activity     Days per week: Not on file     Minutes per session: Not on file    Stress: Not on file   Relationships    Social connections     Talks on phone: Not on file     Gets together: Not on file     Attends Jainism service: Not on file     Active member of club or organization: Not on file     Attends meetings of clubs or organizations: Not on file     Relationship status: Not on file    Intimate partner violence     Fear of current or ex partner: Not on file     Emotionally abused: Not on file     Physically abused: Not on file     Forced sexual activity: Not on file   Other Topics Concern    Not on file   Social History Narrative    Not on file       Allergies: Allergies   Allergen Reactions    Morphine Hives and Itching     Tolerates codeine, hydrocodone, hydromorphone    Advil [Ibuprofen] Other (comments)     BLE 4+ edema. Tolerates Aleve without BLE edema    Bactrim [Sulfamethoprim Ds] Other (comments)     Stomach burning    Keflex [Cephalexin] Diarrhea and Nausea and Vomiting     Tolerates PCNs    Macrobid [Nitrofurantoin Monohyd/M-Cryst] Diarrhea and Nausea and Vomiting    Macrodantin [Nitrofurantoin Macrocrystalline] Diarrhea and Nausea and Vomiting    Klonopin [Clonazepam] Other (comments)     Felt hung over. Stopped on Monday 6/27/2016    Sulfamethoxazole-Trimethoprim Nausea and Vomiting       Current Medications:  Current Outpatient Medications   Medication Sig    AZITHROMYCIN PO Take  by mouth. 3x a week for copd    cholecalciferol (VITAMIN D3) (1000 Units /25 mcg) tablet Take  by mouth daily.  Biotin 2,500 mcg cap Take  by mouth.  lurasidone (LATUDA) 40 mg tab tablet Take 20 mg by mouth daily (with dinner).  levothyroxine (SYNTHROID) 75 mcg tablet Take 75 mcg by mouth Daily (before breakfast).  lamoTRIgine (LAMICTAL) 150 mg tablet Take 150 mg by mouth daily.  LEVOMEFOLATE CALCIUM (DEPLIN PO) Take  by mouth daily. Dose unknown    clorazepate (TRANXENE) 7.5 mg tablet Take 3.75 mg by mouth two (2) times daily as needed for Anxiety.  lovastatin (MEVACOR) 20 mg tablet Take 20 mg by mouth daily.  therapeutic multivitamin (THERAGRAN) tablet Take 1 Tab by mouth daily (after lunch).  calcium carbonate (OS-KANDI) 500 mg calcium (1,250 mg) tablet Take 1 Tab by mouth daily (after lunch).      Current Facility-Administered Medications   Medication Dose Route Frequency    midazolam (VERSED) 1 mg/mL injection        midazolam (VERSED) injection 5 mg  5 mg IntraVENous Multiple    fentaNYL citrate (PF) injection 100 mcg  100 mcg IntraVENous Multiple    0.9% sodium chloride infusion  25 mL/hr IntraVENous CONTINUOUS        Physical Exam:  Blood pressure 110/51, pulse 77, temperature 98.8 °F (37.1 °C), resp. rate 16, height 5' 4\" (1.626 m), weight 60.3 kg (133 lb), SpO2 95 %, not currently breastfeeding. GENERAL: alert, cooperative, no distress, appears stated age, LUNG: clear to auscultation bilaterally, HEART: regular rate and rhythm    Findings/Diagnosis: conscious sedation (Versed and fentanyl) for bone marrow biopsy    Alerts:    Hospital Problems  Date Reviewed: 5/26/2020    None          Laboratory:    No results for input(s): HGB, HGBEXT, HCT, HCTEXT, WBC, PLT, PLTEXT, INR, BUN, CREA, K, CRCLT, HGBEXT, HCTEXT, PLTEXT, INREXT in the last 72 hours. No lab exists for component: PTT, PT      Plan of Care/Planned Procedure:  Risks, benefits, and alternatives reviewed with patient and she agrees to proceed with the procedure. Full dictated report to follow.

## 2020-06-04 NOTE — PROGRESS NOTES
1030 am- Discharge instructions reviewed with patient with good understanding. Patient signed hard copy of discharge instructions and copy given to patient upon leaving. Band aid to right lower back/hip (BM BX site) remains dry and intact. IV discontinued. Patient taken to car via wheelchair with nurse at side.

## 2020-06-04 NOTE — DISCHARGE INSTRUCTIONS
Patient Education     [address] [facility]  Radiology Department  [phone]    Radiologist:  Dr. Vincent Roy    Date: 6/4/20       Bone Marrow Biopsy Discharge Instructions      Go home and rest  and restrict your activity the next 24 hours. You have been given sedating medications, so do not drive or make important decisions today. Resume your previous diet and prescribed medications. You may shower in 24 hours. Do not soak or swim until the biopsy site has healed completely to minimize any risk of infection. Watch site for redness, drainage, pus, foul odor, increasing pain and fevers. Should this occur call you doctor immediatly. You may take Tylenol if allowed, as directed on the label, for pain or discomfort. Avoid Ibuprofen (Advil, Motrin etc.) and Aspirin today as they may increase your risk of bleeding. Be sure to follow up with your physician, and let him know how you are progressing and to receive your results. If you have any questions about your procedure today please call and ask to speak to a radiology nurse. I    Side effects of sedation medications and other medications used today have been reviewed. Notify us of nausea, itching, hives, dizziness, or anything else out of the ordinary. Should you experience any of these significant changes, please call [phone] between the hours of 7:30 am and 10 pm or [phone] after hours. After hours, ask the  to page the X-ray Technologist, and describe the problem to the technologist.         Bone Marrow Aspiration and Biopsy in Children: What to Expect at [address] marrow aspiration is a type of procedure. It is done to take out a small amount of bone marrow fluid through a needle. The biopsy site may feel sore for several days. Walking can help. You can also give your child pain medicine and put ice packs on the site.  Your child will likely be able to do his or her normal activities the next day. Your doctor or nurse will call you with the results of your child's test.  This care sheet gives you a general idea about how long it will take for your child to recover. But each child recovers at a different pace. Follow the steps below to help your child get better as soon as possible. How can you care for your child at home? Activity  · Have your child rest when he or she feels tired. · Most children are able to return to school the day after the procedure. Medicines  · Your doctor will tell you if and when your child can restart his or her medicines. The doctor will also give you instructions about your child taking any new medicines. · Be safe with medicines. Read and follow all instructions on the label. ? If the doctor gave your child a prescription medicine for pain, give it as prescribed. ? If your child is not taking a prescription pain medicine, ask the doctor if your child can take an over-the-counter medicine. · If you think your child's pain medicine is making your child sick to his or her stomach:  ? Give your child the medicine after meals (unless your doctor has told you not to). ? Ask your doctor for a different pain medicine. · If the doctor prescribed antibiotics for your child, give them as directed. Do not stop using them just because your child feels better. Your child needs to take the full course of antibiotics. Ice  · Put ice or a cold pack on the site for 10 to 20 minutes at a time. Put a thin cloth between the ice and your child's skin. Follow-up care is a key part of your child's treatment and safety. Be sure to make and go to all appointments, and call your doctor if your child is having problems. It's also a good idea to know your child's test results and keep a list of the medicines your child takes. When should you call for help? [id number] anytime you think your child may need emergency care.  For example, call if:  · Your child passes out (loses consciousness). Call your doctor now or seek immediate medical care if:  · Your child has increased tenderness, pain, redness, or swelling at the biopsy site. · Your child has a fever over 100°F.  · Your child has bleeding or pus draining from the biopsy site. Watch closely for changes in your child's health, and be sure to contact your doctor if:  · Your child is not getting better as expected. Where can you learn more? Go to http://randal-heidy.info/  Enter K050 in the search box to learn more about \"Bone Marrow Aspiration and Biopsy in Children: What to Expect at Home. \"  Current as of: December 9, 2019               Content Version: 12.5  © 6830-3805 [employer]. Care instructions adapted under license by [employer] (which disclaims liability or warranty for this information). If you have questions about a medical condition or this instruction, always ask your healthcare professional. [address] any warranty or liability for your use of this information.

## 2020-06-14 NOTE — PROGRESS NOTES
[facility] at [address], [address] [place] Gris: [phone]  F: [phone]      Reason for Visit:   Chiqui Patiño is a 81 y.o. female who is seen by synchronous (real-time) audio-video technology for follow up of anemia and thrombocytopenia. History of Present Illness:   She returns to review her bone marrow biopsy results. She reports feeling about the same, no new issues since last here. Some fatigue. Doesn't sleep great and some depression related to the pandemic. Saw psychiatrist earlier this week, making some medication changes. She is unaccompanied on the video call today. She is a retired nurse. PAST HISTORY: The following sections were reviewed and updated in the EMR as appropriate: PMH, SH, FH, Medications, Allergies. Allergies   Allergen Reactions    Morphine Hives and Itching     Tolerates codeine, hydrocodone, hydromorphone    Advil [Ibuprofen] Other (comments)     BLE 4+ edema. Tolerates Aleve without BLE edema    Bactrim [Sulfamethoprim Ds] Other (comments)     Stomach burning    Keflex [Cephalexin] Diarrhea and Nausea and Vomiting     Tolerates PCNs    Macrobid [Nitrofurantoin Monohyd/M-Cryst] Diarrhea and Nausea and Vomiting    Macrodantin [Nitrofurantoin Macrocrystalline] Diarrhea and Nausea and Vomiting    Klonopin [Clonazepam] Other (comments)     Felt hung over. Stopped on Monday 6/27/2016    Sulfamethoxazole-Trimethoprim Nausea and Vomiting      Review of Systems: A complete review of systems was obtained, reviewed. Pertinent findings reviewed above. Physical Exam:   There were no vitals taken for this visit.   General: alert, cooperative, no distress   Mental  status: normal mood, behavior, speech, dress, motor activity, and thought processes, able to follow commands   HENT: NCAT   Neck: no visualized mass   Resp: no respiratory distress   Neuro: no gross deficits   Skin: no discoloration or lesions of concern on visible areas   Psychiatric: normal affect, consistent with stated mood, no evidence of hallucinations       Due to this being a TeleHealth evaluation (During JBBOF-23 public health emergency), many elements of the physical examination are unable to be assessed. Evaluation of the following organ systems was limited: Vitals/Constitutional/EENT/Resp/CV/GI//MS/Neuro/Skin/Heme-Lymph-Imm. Results:     Lab Results   Component Value Date/Time    WBC 4.1 06/04/2020 08:32 AM    HGB 11.6 06/04/2020 08:32 AM    HCT 34.6 (L) 06/04/2020 08:32 AM    PLATELET 63 (L) 59/57/7278 08:32 AM    MCV 97.2 06/04/2020 08:32 AM    ABS. NEUTROPHILS 2.1 06/04/2020 08:32 AM     Lab Results   Component Value Date/Time    Sodium 142 06/29/2016 10:46 AM    Potassium 3.4 (L) 06/29/2016 10:46 AM    Chloride 106 06/29/2016 10:46 AM    CO2 27 06/29/2016 10:46 AM    Glucose 131 (H) 06/29/2016 10:46 AM    BUN 9 06/29/2016 10:46 AM    Creatinine 0.96 06/29/2016 10:46 AM    GFR est AA >60 06/29/2016 10:46 AM    GFR est non-AA 59 (L) 06/29/2016 10:46 AM    Calcium 9.3 06/29/2016 10:46 AM     Lab Results   Component Value Date/Time    Bilirubin, total 0.3 05/20/2020 01:04 PM    ALT (SGPT) 27 05/20/2020 01:04 PM    Alk. phosphatase 79 05/20/2020 01:04 PM    Protein, total 7.2 05/20/2020 01:04 PM    Protein, total 6.9 05/20/2020 01:04 PM    Albumin 4.2 05/20/2020 01:04 PM    Globulin 3.0 05/20/2020 01:04 PM     Lab Results   Component Value Date/Time    Reticulocyte count 2.2 (H) 05/20/2020 01:04 PM    Iron % saturation 22 05/20/2020 01:04 PM    TIBC 329 05/20/2020 01:04 PM    Ferritin 31 05/20/2020 01:04 PM    Vitamin B12 820 05/20/2020 01:04 PM    Folate 16.7 05/20/2020 01:04 PM    Haptoglobin 155 05/20/2020 01:04 PM     (H) 05/20/2020 01:04 PM    TSH 0.30 (L) 06/29/2016 10:46 AM    M-Gabriele Not Observed 05/20/2020 01:04 PM    JOSE, Direct Positive (A) 05/20/2020 01:04 PM    Hep C  virus Ab Interp.  NONREACTIVE 05/20/2020 01:01 PM     PLATELET (K/uL)   Date Value   06/04/2020 63 (L)   05/20/2020 69 (L)   06/29/2016 177   05/11/2010 252     M-SPIKE  Recent Labs     05/20/20  1304   PE6T Not Observed       Free Kappa Light Chains  No results for input(s): KLFL1L in the last 7224 hours. Free Lambda Light Chains  No results for input(s): KLFL2L in the last 7224 hours. Light Chain Ratio  No results for input(s): KLFL3L in the last 7224 hours. UPEP M-spike  No results for input(s): IEU8L, UPE6T in the last 7224 hours. 8/15/2019  WBC: 5.8  HGB: 12.9  PLT: 138    9/5/2019  WBC: 5.2  HGB: 12.4  PLT: 128    5/1/2020  WBC: 5.2  HGB: 11.6  PLT: 65    5/20/2020:  Smear: Thrombocytopenia. Normochromic normocytic red blood cells. Unremarkable white blood cells. HPylori ab: negative  MARILYN: IgG monoclonal protein with kappa light chain specificity    US abdomen 7/13/2015:  Liver and spleen wnl  US abdomen 5/28/2020: normal, no splenomegaly    BMBx 6/4/2020:  Normocellular marrow with multilineage hematopoiesis and megakaryocytic hyperplasia. See comment. Increased plasma cells (6% of total cells). See comment. Negative for increase in blasts. Decreased storage iron. Normal FISH and Cytogenetics  Comment   The patient's history of thrombocytopenia is noted. The bone marrow has mild atypical megakaryocytic hyperplasia without other associated features of dysplasia. The findings are nonspecific and may be seen in association with ITP; clinical exclusion of non-neoplastic causes of thrombocytopenia including nutritional deficiency, autoimmune disorders, sequestration, etc. is recommended. The bone marrow has 6% plasma cells with kappa predominance but no definitive evidence of monoclonality. The patient's history of IgG kappa monoclonal protein is noted and the differential diagnosis includes Monoclonal Gammopathy of Undetermined Significance.     Assessment:   1) Chronic ITP  She has a moderate and progressive thrombocytopenia. Extensive laboratory and bone marrow evaluation have been unremarkable, leaving us with the most likely diagnosis of chronic ITP. Positive JOSE is consistent. At this time there is no indication for treatment and I recommend we monitor. 2) Anemia  Resolved, though hemoglobin remains at the low end of normal.  Bone marrow suggests iron deficiency, and ferritin was at the low end of normal.  Start oral iron with vitamin C.    3) MGUS  MARILYN with monoclonal IgG, though M-spike is negative. She did not get the [facility] or 24 hour UPEP that I had ordered, so we will obtain these before her next visit. Bone marrow with 6% plasma cells, consistent with MGUS. She is at a small risk of developing myeloma in the future, so we will monitor. Repeat MGUS labs with her next lab draw, and then yearly. Plan:     · Start ferrous sulfate 325mg PO daily  · Labs in 3-4 months: CBC, SPEP, MARILYN, SFLC, renal function panel, 24 hour UPEP, iron profile, ferritin (she prefers [facility])  · Return to see me in 3-4 months    [facility] for video visits. Signed By: Baltazar Cai MD      I was in the office while conducting this encounter. The patient was at her home. Consent:  She and/or her healthcare decision maker is aware that this patient-initiated Telehealth encounter is a billable service, with coverage as determined by her insurance carrier. She is aware that she may receive a bill and has provided verbal consent to proceed: Yes    Pursuant to the emergency declaration under the [employer] and the [facility], 1135 waiver authority and the [employer] and [employer] Act, this Virtual  Visit was conducted, with patient's (and/or legal guardian's) consent, to reduce the patient's risk of exposure to COVID-19 and provide necessary medical care. Services were provided through a video synchronous discussion virtually to substitute for in-person clinic visit.

## 2020-06-15 ENCOUNTER — HOSPITAL ENCOUNTER (OUTPATIENT)
Dept: LAB | Age: 70
Discharge: HOME | End: 2020-06-15

## 2020-06-15 DIAGNOSIS — D69.6 THROMBOCYTOPENIA (HCC): ICD-10-CM

## 2020-06-15 DIAGNOSIS — D64.9 ANEMIA, UNSPECIFIED TYPE: ICD-10-CM

## 2020-06-15 DIAGNOSIS — D47.2 MGUS (MONOCLONAL GAMMOPATHY OF UNKNOWN SIGNIFICANCE): ICD-10-CM

## 2020-06-17 LAB
ALBUMIN SERPL ELPH-MCNC: 3.8 G/DL (ref 2.9–4.4)
ALBUMIN/GLOB SERPL: 1.4 {RATIO} (ref 0.7–1.7)
ALPHA1 GLOB SERPL ELPH-MCNC: 0.3 G/DL (ref 0–0.4)
ALPHA2 GLOB SERPL ELPH-MCNC: 0.8 G/DL (ref 0.4–1)
B-GLOBULIN SERPL ELPH-MCNC: 1.1 G/DL (ref 0.7–1.3)
GAMMA GLOB SERPL ELPH-MCNC: 0.8 G/DL (ref 0.4–1.8)
GLOBULIN SER-MCNC: 2.9 G/DL (ref 2.2–3.9)
IGA SERPL-MCNC: 199 MG/DL (ref 87–352)
IGG SERPL-MCNC: 884 MG/DL (ref 586–1602)
IGM SERPL-MCNC: 192 MG/DL (ref 26–217)
INTERPRETATION SERPL IEP-IMP: ABNORMAL
KAPPA LC FREE SER-MCNC: 23.6 MG/L (ref 3.3–19.4)
KAPPA LC FREE/LAMBDA FREE SER: 1.76 {RATIO} (ref 0.26–1.65)
LAMBDA LC FREE SERPL-MCNC: 13.4 MG/L (ref 5.7–26.3)
M PROTEIN SERPL ELPH-MCNC: ABNORMAL G/DL
PROT SERPL-MCNC: 6.7 G/DL (ref 6–8.5)

## 2020-06-19 ENCOUNTER — VIRTUAL VISIT (OUTPATIENT)
Dept: ONCOLOGY | Age: 70
End: 2020-06-19

## 2020-06-19 DIAGNOSIS — D47.2 MGUS (MONOCLONAL GAMMOPATHY OF UNKNOWN SIGNIFICANCE): ICD-10-CM

## 2020-06-19 DIAGNOSIS — D69.6 THROMBOCYTOPENIA (HCC): Primary | ICD-10-CM

## 2020-06-19 DIAGNOSIS — D64.9 ANEMIA, UNSPECIFIED TYPE: ICD-10-CM

## 2020-06-19 NOTE — PATIENT INSTRUCTIONS
Thank you for participating in the virtual visit with Dr. Adore Salinas. Someone will call you to schedule a follow up appointment with us in 3-4 months. If you do not hear from us, please call us at [phone] to schedule your appointment. Please use the enclosed lab slip to have your labs done 1-2 weeks before your next appointment.

## 2020-06-23 ENCOUNTER — HOSPITAL ENCOUNTER (OUTPATIENT)
Dept: CT IMAGING | Age: 70
Discharge: HOME | End: 2020-06-23
Attending: INTERNAL MEDICINE
Payer: MEDICARE

## 2020-06-23 VITALS — BODY MASS INDEX: 23.05 KG/M2 | HEIGHT: 64 IN | WEIGHT: 135 LBS

## 2020-06-23 DIAGNOSIS — Z87.891 PERSONAL HISTORY OF NICOTINE DEPENDENCE: ICD-10-CM

## 2020-06-23 PROCEDURE — G0297 LDCT FOR LUNG CA SCREEN: HCPCS

## 2020-08-19 ENCOUNTER — TELEPHONE (OUTPATIENT)
Dept: ONCOLOGY | Age: 70
End: 2020-08-19

## 2020-08-19 NOTE — TELEPHONE ENCOUNTER
Patient call about lab work from her PCP. She is concern about the level. Did explain that we did not have a copy and to get them to fax it over.   Will get provider to look at and give her a call back        [phone]

## 2020-08-19 NOTE — TELEPHONE ENCOUNTER
[address] at [facility]  [phone]    08/19/20- Call placed to Dr. Odalys Antonio office, voicemail left for medical records requesting labs to be faxed over.

## 2020-08-19 NOTE — TELEPHONE ENCOUNTER
Labs from PCP reviewed. WBC and HGB stable. PLT have fallen a bit to 41. Still no indication for therapy at this time. May consider a course of steroids if PLT fall <30k or if she develops bleeding. Ensure she is not taking any anticoauglants or antiplatelets (ie aspirin). She has follow up with me in one month with labs prior and we will take a look at that time.

## 2020-08-19 NOTE — TELEPHONE ENCOUNTER
[facility]  [phone]    20- Informed patient that per Dr. Joseph Armenta from PCP reviewed. WBC and HGB stable. PLT have fallen a bit to 41. Still no indication for therapy at this time. May consider a course of steroids if PLT fall <30k or if she develops bleeding. Ensure she is not taking any anticoauglants or antiplatelets (ie aspirin). She has follow up with me in one month with labs prior and we will take a look at that time. \"      Patient confirmed that she does not take any anticoagulant or antiplatelet medications. Denies bleeding issues at this time. Reviewed the plan to repeat labs and follow up in a month, but to call with any questions or concerns prior to then. She verbalized understanding. Patient mentioned that she might want to come into the office for her next visit, but will call back to let us know. No further questions or concerns.

## 2020-09-08 LAB
ALBUMIN SERPL ELPH-MCNC: 4 G/DL (ref 2.9–4.4)
ALBUMIN SERPL-MCNC: 4.5 G/DL (ref 3.8–4.8)
ALBUMIN/GLOB SERPL: 1.5 {RATIO} (ref 0.7–1.7)
ALPHA1 GLOB SERPL ELPH-MCNC: 0.2 G/DL (ref 0–0.4)
ALPHA2 GLOB SERPL ELPH-MCNC: 0.7 G/DL (ref 0.4–1)
B-GLOBULIN SERPL ELPH-MCNC: 1 G/DL (ref 0.7–1.3)
BASOPHILS # BLD AUTO: 0 X10E3/UL (ref 0–0.2)
BASOPHILS NFR BLD AUTO: 1 %
BUN SERPL-MCNC: 13 MG/DL (ref 8–27)
BUN/CREAT SERPL: 15 (ref 12–28)
CALCIUM SERPL-MCNC: 9.7 MG/DL (ref 8.7–10.3)
CHLORIDE SERPL-SCNC: 105 MMOL/L (ref 96–106)
CO2 SERPL-SCNC: 20 MMOL/L (ref 20–29)
CREAT SERPL-MCNC: 0.89 MG/DL (ref 0.57–1)
EOSINOPHIL # BLD AUTO: 0.1 X10E3/UL (ref 0–0.4)
EOSINOPHIL NFR BLD AUTO: 2 %
ERYTHROCYTE [DISTWIDTH] IN BLOOD BY AUTOMATED COUNT: 13.4 % (ref 11.7–15.4)
FERRITIN SERPL-MCNC: 72 NG/ML (ref 15–150)
GAMMA GLOB SERPL ELPH-MCNC: 0.8 G/DL (ref 0.4–1.8)
GLOBULIN SER-MCNC: 2.8 G/DL (ref 2.2–3.9)
GLUCOSE SERPL-MCNC: 113 MG/DL (ref 65–99)
HCT VFR BLD AUTO: 31.2 % (ref 34–46.6)
HGB BLD-MCNC: 11.4 G/DL (ref 11.1–15.9)
IGA SERPL-MCNC: 212 MG/DL (ref 87–352)
IGG SERPL-MCNC: 925 MG/DL (ref 586–1602)
IGM SERPL-MCNC: 210 MG/DL (ref 26–217)
IMM GRANULOCYTES # BLD AUTO: 0 X10E3/UL (ref 0–0.1)
IMM GRANULOCYTES NFR BLD AUTO: 0 %
INTERPRETATION SERPL IEP-IMP: ABNORMAL
IRON SATN MFR SERPL: 26 % (ref 15–55)
IRON SERPL-MCNC: 76 UG/DL (ref 27–139)
KAPPA LC FREE SER-MCNC: 24.2 MG/L (ref 3.3–19.4)
KAPPA LC FREE/LAMBDA FREE SER: 1.52 {RATIO} (ref 0.26–1.65)
LAMBDA LC FREE SERPL-MCNC: 15.9 MG/L (ref 5.7–26.3)
LYMPHOCYTES # BLD AUTO: 1 X10E3/UL (ref 0.7–3.1)
LYMPHOCYTES NFR BLD AUTO: 22 %
M PROTEIN SERPL ELPH-MCNC: ABNORMAL G/DL
MCH RBC QN AUTO: 35.6 PG (ref 26.6–33)
MCHC RBC AUTO-ENTMCNC: 36.5 G/DL (ref 31.5–35.7)
MCV RBC AUTO: 98 FL (ref 79–97)
MONOCYTES # BLD AUTO: 0.4 X10E3/UL (ref 0.1–0.9)
MONOCYTES NFR BLD AUTO: 8 %
MORPHOLOGY BLD-IMP: ABNORMAL
NEUTROPHILS # BLD AUTO: 2.9 X10E3/UL (ref 1.4–7)
NEUTROPHILS NFR BLD AUTO: 67 %
PHOSPHATE SERPL-MCNC: 3.2 MG/DL (ref 3–4.3)
PLATELET # BLD AUTO: 53 X10E3/UL (ref 150–450)
PLEASE NOTE:, 149534: ABNORMAL
POTASSIUM SERPL-SCNC: 4.2 MMOL/L (ref 3.5–5.2)
PROT SERPL-MCNC: 6.8 G/DL (ref 6–8.5)
RBC # BLD AUTO: 3.2 X10E6/UL (ref 3.77–5.28)
SODIUM SERPL-SCNC: 144 MMOL/L (ref 134–144)
TIBC SERPL-MCNC: 287 UG/DL (ref 250–450)
UIBC SERPL-MCNC: 211 UG/DL (ref 118–369)
WBC # BLD AUTO: 4.3 X10E3/UL (ref 3.4–10.8)

## 2020-09-10 NOTE — PROGRESS NOTES
[facility] at [address], [address] [facility] [address] [facility]  Cyndi Glalewis: [phone]  F: [phone]      Reason for Visit:   Jhony Howard is a 81 y.o. female who is seen by synchronous (real-time) audio-video technology for follow up of anemia and thrombocytopenia. History of Present Illness:   She reports doing well. No new medical issues since last here. Denies any bleeding or bruising. No recent infections No fevers, chills, night sweats, unintentional weight loss, adenopathy. No bone pain. Currently on oral iron, taking this BIW which is as much as she can tolerate. She is unaccompanied on the video call today. She is a retired nurse. PAST HISTORY: The following sections were reviewed and updated in the EMR as appropriate: PMH, SH, FH, Medications, Allergies. Allergies   Allergen Reactions    Morphine Hives and Itching     Tolerates codeine, hydrocodone, hydromorphone    Advil [Ibuprofen] Other (comments)     BLE 4+ edema. Tolerates Aleve without BLE edema    Bactrim [Sulfamethoprim Ds] Other (comments)     Stomach burning    Keflex [Cephalexin] Diarrhea and Nausea and Vomiting     Tolerates PCNs    Macrobid [Nitrofurantoin Monohyd/M-Cryst] Diarrhea and Nausea and Vomiting    Macrodantin [Nitrofurantoin Macrocrystalline] Diarrhea and Nausea and Vomiting    Klonopin [Clonazepam] Other (comments)     Felt hung over. Stopped on Monday 6/27/2016    Sulfamethoxazole-Trimethoprim Nausea and Vomiting      Review of Systems: A complete review of systems was obtained, reviewed. Pertinent findings reviewed above. Physical Exam:   There were no vitals taken for this visit.   General: alert, cooperative, no distress   Mental  status: normal mood, behavior, speech, dress, motor activity, and thought processes, able to follow commands   HENT: NCAT   Neck: no visualized mass   Resp: no respiratory distress   Neuro: no gross deficits   Skin: no discoloration or lesions of concern on visible areas   Psychiatric: normal affect, consistent with stated mood, no evidence of hallucinations       Due to this being a TeleHealth evaluation (During [facility]- public health emergency), many elements of the physical examination are unable to be assessed. Evaluation of the following organ systems was limited: Vitals/Constitutional/EENT/Resp/CV/GI//MS/Neuro/Skin/Heme-Lymph-Imm. Results:     Lab Results   Component Value Date/Time    WBC 4.3 09/03/2020 03:08 PM    HGB 11.4 09/03/2020 03:08 PM    HCT 31.2 (L) 09/03/2020 03:08 PM    PLATELET 53 (LL) 18/00/0320 03:08 PM    MCV 98 (H) 09/03/2020 03:08 PM    ABS. NEUTROPHILS 2.9 09/03/2020 03:08 PM     Lab Results   Component Value Date/Time    Sodium 144 09/03/2020 03:08 PM    Potassium 4.2 09/03/2020 03:08 PM    Chloride 105 09/03/2020 03:08 PM    CO2 20 09/03/2020 03:08 PM    Glucose 113 (H) 09/03/2020 03:08 PM    BUN 13 09/03/2020 03:08 PM    Creatinine 0.89 09/03/2020 03:08 PM    GFR est AA 78 09/03/2020 03:08 PM    GFR est non-AA 67 09/03/2020 03:08 PM    Calcium 9.7 09/03/2020 03:08 PM     Lab Results   Component Value Date/Time    Bilirubin, total 0.3 05/20/2020 01:04 PM    ALT (SGPT) 27 05/20/2020 01:04 PM    Alk. phosphatase 79 05/20/2020 01:04 PM    Protein, total 6.8 09/03/2020 03:08 PM    Albumin 4.5 09/03/2020 03:08 PM    Globulin 3.0 05/20/2020 01:04 PM     Lab Results   Component Value Date/Time    Reticulocyte count 2.2 (H) 05/20/2020 01:04 PM    Iron % saturation 26 09/03/2020 03:08 PM    TIBC 287 09/03/2020 03:08 PM    Ferritin 72 09/03/2020 03:08 PM    Vitamin B12 820 05/20/2020 01:04 PM    Folate 16.7 05/20/2020 01:04 PM    Haptoglobin 155 05/20/2020 01:04 PM     (H) 05/20/2020 01:04 PM    TSH 0.30 (L) 06/29/2016 10:46 AM    M-Gabriele Not Observed 09/03/2020 03:08 PM    JOSE, Direct Positive (A) 05/20/2020 01:04 PM    Hep C  virus Ab Interp.  NONREACTIVE 05/20/2020 01:01 PM     PLATELET   Date Value   09/03/2020 53 x10E3/uL (LL)   06/04/2020 63 K/uL (L)   05/20/2020 69 K/uL (L)   06/29/2016 177 K/uL   05/11/2010 252 K/uL       M-SPIKE  Recent Labs     09/03/20  1508 06/15/20  1145 05/20/20  1304   PE6T Not Observed Not Observed Not Observed       Free Kappa Light Chains  Recent Labs     09/03/20  1508 06/15/20  1145   KLFL1L 24.2* 23.6*       Free Lambda Light Chains  Recent Labs     09/03/20  1508 06/15/20  1145   KLFL2L 15.9 13.4       Light Chain Ratio  Recent Labs     09/03/20  1508 06/15/20  1145   KLFL3L 1.52 1.76*       UPEP M-spike  Recent Labs     09/08/20  0915   IEU8L Not Observed           8/15/2019  WBC: 5.8  HGB: 12.9  PLT: 138    9/5/2019  WBC: 5.2  HGB: 12.4  PLT: 128    5/1/2020  WBC: 5.2  HGB: 11.6  PLT: 65    5/20/2020:  Smear: Thrombocytopenia. Normochromic normocytic red blood cells. Unremarkable white blood cells. HPylori ab: negative  MARILYN: IgG monoclonal protein with kappa light chain specificity    US abdomen 7/13/2015:  Liver and spleen wnl  US abdomen 5/28/2020: normal, no splenomegaly    BMBx 6/4/2020:  Normocellular marrow with multilineage hematopoiesis and megakaryocytic hyperplasia. See comment. Increased plasma cells (6% of total cells). See comment. Negative for increase in blasts. Decreased storage iron. Normal FISH and Cytogenetics  Comment   The patient's history of thrombocytopenia is noted. The bone marrow has mild atypical megakaryocytic hyperplasia without other associated features of dysplasia. The findings are nonspecific and may be seen in association with ITP; clinical exclusion of non-neoplastic causes of thrombocytopenia including nutritional deficiency, autoimmune disorders, sequestration, etc. is recommended. The bone marrow has 6% plasma cells with kappa predominance but no definitive evidence of monoclonality.  The patient's history of IgG kappa monoclonal protein is noted and the differential diagnosis includes Monoclonal Gammopathy of Undetermined Significance. Assessment:   1) Chronic ITP  She has a moderate and progressive thrombocytopenia. Extensive laboratory and bone marrow evaluation have been unremarkable, leaving us with the most likely diagnosis of chronic ITP. Positive JOSE is consistent and megakaryocytic hyperplasia on bone marrow biopsy are consistent. At this time there is no indication for treatment and I recommend we monitor. When platelets drop into the 30's we will consider a trial of pulse dose dexamethasone. 2) Anemia  Resolved, though hemoglobin remains at the low end of normal.  Bone marrow suggests iron deficiency, and ferritin was at the low end of normal.  Continue oral iron with vitamin C. Ferritin is increasing, up to 72 from 31.    3) MGUS  MARILYN with monoclonal IgG, though M-spike is negative and SFLC ratio normal.  24 hour UPEP negative. Bone marrow with 6% plasma cells, consistent with MGUS. She is at a small risk of developing myeloma in the future, so we will monitor. Repeat labs yearly, due again 9/2021. Plan:     · Continue ferrous sulfate 325mg PO as tolerated  · Labs in 6 weeks and 12 weeks: CBC (she prefers [facility])  · Return to see me in 3 months    [facility] for video visits. Signed By: Aubrey Day MD      I was in the office while conducting this encounter. The patient was at her home. Consent:  She and/or her healthcare decision maker is aware that this patient-initiated Telehealth encounter is a billable service, with coverage as determined by her insurance carrier.  She is aware that she may receive a bill and has provided verbal consent to proceed: Yes    Pursuant to the emergency declaration under the [employer] and the [address] waiver authority and the [employer] Act, this Virtual  Visit was conducted, with patient's (and/or legal guardian's) consent, to reduce the patient's risk of exposure to COVID-19 and provide necessary medical care. Services were provided through a video synchronous discussion virtually to substitute for in-person clinic visit.

## 2020-09-11 LAB
ALBUMIN 24H MFR UR ELPH: 0 %
ALPHA1 GLOB 24H MFR UR ELPH: 0 %
ALPHA2 GLOB 24H MFR UR ELPH: 0 %
B-GLOBULIN MFR UR ELPH: 0 %
GAMMA GLOB 24H MFR UR ELPH: 0 %
INTERPRETATION UR IFE-IMP: NORMAL
M PROTEIN 24H MFR UR ELPH: NORMAL %
NOTE, 149533: NORMAL
PROT 24H UR-MRATE: 108 MG/24 HR (ref 30–150)
PROT UR-MCNC: 5.4 MG/DL

## 2020-09-15 ENCOUNTER — VIRTUAL VISIT (OUTPATIENT)
Dept: ONCOLOGY | Age: 70
End: 2020-09-15
Payer: MEDICARE

## 2020-09-15 DIAGNOSIS — D64.9 ANEMIA, UNSPECIFIED TYPE: ICD-10-CM

## 2020-09-15 DIAGNOSIS — D47.2 MGUS (MONOCLONAL GAMMOPATHY OF UNKNOWN SIGNIFICANCE): ICD-10-CM

## 2020-09-15 DIAGNOSIS — D69.6 THROMBOCYTOPENIA (HCC): Primary | ICD-10-CM

## 2020-09-15 PROCEDURE — G8536 NO DOC ELDER MAL SCRN: HCPCS | Performed by: INTERNAL MEDICINE

## 2020-09-15 PROCEDURE — 1090F PRES/ABSN URINE INCON ASSESS: CPT | Performed by: INTERNAL MEDICINE

## 2020-09-15 PROCEDURE — G8420 CALC BMI NORM PARAMETERS: HCPCS | Performed by: INTERNAL MEDICINE

## 2020-09-15 PROCEDURE — G8427 DOCREV CUR MEDS BY ELIG CLIN: HCPCS | Performed by: INTERNAL MEDICINE

## 2020-09-15 PROCEDURE — 3017F COLORECTAL CA SCREEN DOC REV: CPT | Performed by: INTERNAL MEDICINE

## 2020-09-15 PROCEDURE — G8432 DEP SCR NOT DOC, RNG: HCPCS | Performed by: INTERNAL MEDICINE

## 2020-09-15 PROCEDURE — 1101F PT FALLS ASSESS-DOCD LE1/YR: CPT | Performed by: INTERNAL MEDICINE

## 2020-09-15 PROCEDURE — G8399 PT W/DXA RESULTS DOCUMENT: HCPCS | Performed by: INTERNAL MEDICINE

## 2020-09-15 PROCEDURE — 99214 OFFICE O/P EST MOD 30 MIN: CPT | Performed by: INTERNAL MEDICINE

## 2020-09-15 NOTE — PROGRESS NOTES
Mendy Pimentel is a 81 y.o. female follow up for anemia. 1. Have you been to the ER, urgent care clinic since your last visit? Hospitalized since your last visit?no     2. Have you seen or consulted any other health care providers outside of the [address] [place] [address] since your last visit? Include any pap smears or colon screening.  no

## 2020-09-15 NOTE — PATIENT INSTRUCTIONS
Thank you for participating in the virtual visit with Dr. Kulwinder Brock. We will call you soon to schedule a follow up appointment with us in 3 months. If you do not hear from us, please call us at [phone] to schedule your appointment. Please use the enclosed lab slip to have your labs done in 6 weeks and 12 weeks, before your next appointment.

## 2020-12-07 ENCOUNTER — TELEPHONE (OUTPATIENT)
Dept: ONCOLOGY | Age: 70
End: 2020-12-07

## 2020-12-07 NOTE — TELEPHONE ENCOUNTER
[address] at [place]  [phone]    12/07/20- Phone call placed to pt to remind pt to have labs drawn prior to her follow up appointment with .  left for patient.

## 2020-12-11 LAB
BASOPHILS # BLD AUTO: 0 X10E3/UL (ref 0–0.2)
BASOPHILS NFR BLD AUTO: 1 %
EOSINOPHIL # BLD AUTO: 0.2 X10E3/UL (ref 0–0.4)
EOSINOPHIL NFR BLD AUTO: 6 %
ERYTHROCYTE [DISTWIDTH] IN BLOOD BY AUTOMATED COUNT: 14.1 % (ref 11.7–15.4)
HCT VFR BLD AUTO: 32.1 % (ref 34–46.6)
HGB BLD-MCNC: 11.1 G/DL (ref 11.1–15.9)
IMM GRANULOCYTES # BLD AUTO: 0 X10E3/UL (ref 0–0.1)
IMM GRANULOCYTES NFR BLD AUTO: 0 %
LYMPHOCYTES # BLD AUTO: 1.1 X10E3/UL (ref 0.7–3.1)
LYMPHOCYTES NFR BLD AUTO: 28 %
MCH RBC QN AUTO: 34.8 PG (ref 26.6–33)
MCHC RBC AUTO-ENTMCNC: 34.6 G/DL (ref 31.5–35.7)
MCV RBC AUTO: 101 FL (ref 79–97)
MONOCYTES # BLD AUTO: 0.3 X10E3/UL (ref 0.1–0.9)
MONOCYTES NFR BLD AUTO: 7 %
MORPHOLOGY BLD-IMP: ABNORMAL
NEUTROPHILS # BLD AUTO: 2.3 X10E3/UL (ref 1.4–7)
NEUTROPHILS NFR BLD AUTO: 58 %
PLATELET # BLD AUTO: 50 X10E3/UL (ref 150–450)
RBC # BLD AUTO: 3.19 X10E6/UL (ref 3.77–5.28)
WBC # BLD AUTO: 4 X10E3/UL (ref 3.4–10.8)

## 2020-12-31 ENCOUNTER — TRANSCRIBE ORDER (OUTPATIENT)
Dept: SCHEDULING | Age: 70
End: 2020-12-31

## 2020-12-31 DIAGNOSIS — Z87.891 PERSONAL HISTORY OF TOBACCO USE, PRESENTING HAZARDS TO HEALTH: Primary | ICD-10-CM

## 2021-01-05 ENCOUNTER — TELEPHONE (OUTPATIENT)
Dept: ONCOLOGY | Age: 71
End: 2021-01-05

## 2021-01-05 DIAGNOSIS — D69.6 THROMBOCYTOPENIA (HCC): ICD-10-CM

## 2021-01-05 DIAGNOSIS — D47.2 MGUS (MONOCLONAL GAMMOPATHY OF UNKNOWN SIGNIFICANCE): Primary | ICD-10-CM

## 2021-01-05 NOTE — TELEPHONE ENCOUNTER
[address] at [facility]  [phone]    01/05/21- Osmel Graham NP from patients PCP office called [phone] called to report patient was seen in their office today for heart palpation and mentioned and increased episiodes of nose bleeds. Today while in their office experienced a nose bleed that last 45 minutes. CBC was checked- plt were 32 & Hbg 10- labs will be faxed over. Patient is not longer here but wanted to make our office aware. Providers informed.

## 2021-01-06 NOTE — TELEPHONE ENCOUNTER
[address] at [facility]  [phone]        01/06/21 11:10 AM Called patient and advised of provider's recommendations. Patient verbalized understanding and is agreeable to plan. Transferred patient to  to assist in scheduling appointment in 1-2 weeks. No further questions or concerns at this time.

## 2021-01-15 NOTE — PROGRESS NOTES
[facility] at [address] [facility] [place]: [phone]  F: [phone]      Reason for Visit:   Thierno Cai is a 78 y.o. female who is seen for follow up of anemia and thrombocytopenia. History of Present Illness:   She has been having increasing nosebleeds recently. She was seen on 1/5/2021 by her PCP where she developed a nosebleed that lasted 45 minutes  Platelets were found to be 32. We brought her back for an earlier appointment, and had a repeat lab check done earlier this week with PLT noted to be 40. Last nose bleed was two nights ago, did not last as long. She denies other bleeding. No recent infections. No new medications. No rash. She took some naproxen recently for her back pain, the week before her bleeding resumed. She is a retired nurse. Review of systems was obtained and pertinent findings reviewed above. Past medical history, social history, family history, medications, and allergies are located in the electronic medical record. Physical Exam:   There were no vitals taken for this visit. General: alert, cooperative, no distress   Mental  status: normal mood, behavior, speech, dress, motor activity, and thought processes, able to follow commands   HENT: NCAT   Neck: no visualized mass   Resp: no respiratory distress   Neuro: no gross deficits   Skin: no discoloration or lesions of concern on visible areas   Psychiatric: normal affect, consistent with stated mood, no evidence of hallucinations       Due to this being a TeleHealth evaluation ([facility]), many elements of the physical examination are unable to be assessed. Evaluation of the following organ systems was limited: Vitals/Constitutional/EENT/Resp/CV/GI//MS/Neuro/Skin/Heme-Lymph-Imm.         Results:     Lab Results   Component Value Date/Time    WBC 3.9 01/14/2021 01:50 PM    HGB 10.0 (L) 01/14/2021 01:50 PM    HCT 29.7 (L) 01/14/2021 01:50 PM    PLATELET 40 (LL) 53/03/9163 01:50 PM     (H) 01/14/2021 01:50 PM    ABS. NEUTROPHILS 2.4 01/14/2021 01:50 PM     Lab Results   Component Value Date/Time    Sodium 144 09/03/2020 03:08 PM    Potassium 4.2 09/03/2020 03:08 PM    Chloride 105 09/03/2020 03:08 PM    CO2 20 09/03/2020 03:08 PM    Glucose 113 (H) 09/03/2020 03:08 PM    BUN 13 09/03/2020 03:08 PM    Creatinine 0.89 09/03/2020 03:08 PM    GFR est AA 78 09/03/2020 03:08 PM    GFR est non-AA 67 09/03/2020 03:08 PM    Calcium 9.7 09/03/2020 03:08 PM     Lab Results   Component Value Date/Time    Bilirubin, total 0.3 05/20/2020 01:04 PM    ALT (SGPT) 27 05/20/2020 01:04 PM    Alk. phosphatase 79 05/20/2020 01:04 PM    Protein, total 6.8 09/03/2020 03:08 PM    Albumin 4.5 09/03/2020 03:08 PM    Globulin 3.0 05/20/2020 01:04 PM     Lab Results   Component Value Date/Time    Reticulocyte count 2.2 (H) 05/20/2020 01:04 PM    Iron % saturation 38 01/14/2021 01:50 PM    TIBC 268 01/14/2021 01:50 PM    Ferritin 131 01/14/2021 01:50 PM    Vitamin B12 820 05/20/2020 01:04 PM    Folate 16.7 05/20/2020 01:04 PM    Haptoglobin 155 05/20/2020 01:04 PM     (H) 05/20/2020 01:04 PM    TSH 0.30 (L) 06/29/2016 10:46 AM    M-Gabriele Not Observed 09/03/2020 03:08 PM    JOSE, Direct Positive (A) 05/20/2020 01:04 PM    Hep C virus Ab Interp.  NONREACTIVE 05/20/2020 01:01 PM     PLATELET   Date Value   01/14/2021 40 x10E3/uL (LL)   12/10/2020 50 x10E3/uL (LL)   09/03/2020 53 x10E3/uL (LL)   06/04/2020 63 K/uL (L)   05/20/2020 69 K/uL (L)   06/29/2016 177 K/uL   05/11/2010 252 K/uL       M-SPIKE  Recent Labs     09/03/20  1508 06/15/20  1145 05/20/20  1304   PE6T Not Observed Not Observed Not Observed       Free Kappa Light Chains  Recent Labs     09/03/20  1508 06/15/20  1145   KLFL1L 24.2* 23.6*       Free Lambda Light Chains  Recent Labs     09/03/20  1508 06/15/20  1145   KLFL2L 15.9 13.4       Light Chain Ratio  Recent Labs 09/03/20  1508 06/15/20  1145   KLFL3L 1.52 1.76*       UPEP M-spike  Recent Labs     09/08/20  0915   IEU8L Not Observed       8/15/2019  WBC: 5.8  HGB: 12.9  PLT: 138    9/5/2019  WBC: 5.2  HGB: 12.4  PLT: 128    5/1/2020  WBC: 5.2  HGB: 11.6  PLT: 65    5/20/2020:  Smear: Thrombocytopenia. Normochromic normocytic red blood cells. Unremarkable white blood cells. HPylori ab: negative  MARILYN: IgG monoclonal protein with kappa light chain specificity    US abdomen 7/13/2015:  Liver and spleen wnl  US abdomen 5/28/2020: normal, no splenomegaly    BMBx 6/4/2020:  Normocellular marrow with multilineage hematopoiesis and megakaryocytic hyperplasia. See comment. Increased plasma cells (6% of total cells). See comment. Negative for increase in blasts. Decreased storage iron. Normal FISH and Cytogenetics  Comment   The patient's history of thrombocytopenia is noted. The bone marrow has mild atypical megakaryocytic hyperplasia without other associated features of dysplasia. The findings are nonspecific and may be seen in association with ITP; clinical exclusion of non-neoplastic causes of thrombocytopenia including nutritional deficiency, autoimmune disorders, sequestration, etc. is recommended. The bone marrow has 6% plasma cells with kappa predominance but no definitive evidence of monoclonality. The patient's history of IgG kappa monoclonal protein is noted and the differential diagnosis includes Monoclonal Gammopathy of Undetermined Significance. Assessment:   1) Chronic ITP  She has a moderate and progressive thrombocytopenia. Extensive laboratory and bone marrow evaluation have been unremarkable, leaving us with the most likely diagnosis of chronic ITP. Positive JOSE is consistent, as is the megakaryocytic hyperplasia on bone marrow biopsy. Her platelets have fallen further into the 30-40 range, and she is now having increasing problems with epistaxis.   I recommend we go ahead and initiate therapy for her ITP to help prevent further problems with bleeding. I will start with dexamethasone 40mg PO daily for 4 days. Monitor labs twice a week for the next two weeks and then return to see me. We will send a standing order to [facility] (she will call back to let us know which one). Avoid NSAIDs and ASA. 2) Macrocytic Anemia  Her HGB is now just at the lower limit of normal, but MCV remains elevated. Bone marrow suggests iron deficiency. She has been on oral iron and ferritin has come up, but anemia has worsened, perhaps due to epistaxis. Monitor for now. 3) MGUS  MARILYN with monoclonal IgG, though M-spike is negative and SFLC ratio normal.  24 hour UPEP negative. Bone marrow with 6% plasma cells, consistent with MGUS. She is at a small risk of developing myeloma in the future, so we will monitor. Repeat labs yearly, due again 9/2021. Plan:     · Start dexamethasone 40mg PO daily x4 days  · Continue ferrous sulfate 325mg PO as tolerated (currently twice a week)  · CBC twice a week ([facility])  · Return to see me in 2-3 weeks    [id number] [address]ona for video visits. Signed By: Patel Ordoñez MD      The patient was seen by synchronous (real-time) audio-video technology. I was in the office while conducting this encounter. The patient was at her home. Consent:  She and/or her healthcare decision maker is aware that this patient-initiated Telehealth encounter is a billable service, with coverage as determined by her insurance carrier. She is aware that she may receive a bill and has provided verbal consent to proceed: Yes    Pursuant to the emergency declaration under the [address] and the [facility], 1135 waiver authority and the [email or web address] and [employer] Act, this Virtual Visit was conducted, with patient's (and/or legal guardian's) consent, to reduce the patient's risk of exposure to COVID-19 and provide necessary medical care.

## 2021-01-21 ENCOUNTER — VIRTUAL VISIT (OUTPATIENT)
Dept: ONCOLOGY | Age: 71
End: 2021-01-21
Payer: MEDICARE

## 2021-01-21 DIAGNOSIS — D69.3 CHRONIC ITP (IDIOPATHIC THROMBOCYTOPENIA) (HCC): ICD-10-CM

## 2021-01-21 DIAGNOSIS — D69.6 THROMBOCYTOPENIA (HCC): ICD-10-CM

## 2021-01-21 DIAGNOSIS — D64.9 ANEMIA, UNSPECIFIED TYPE: ICD-10-CM

## 2021-01-21 DIAGNOSIS — D47.2 MGUS (MONOCLONAL GAMMOPATHY OF UNKNOWN SIGNIFICANCE): Primary | ICD-10-CM

## 2021-01-21 PROCEDURE — G8399 PT W/DXA RESULTS DOCUMENT: HCPCS | Performed by: INTERNAL MEDICINE

## 2021-01-21 PROCEDURE — G8420 CALC BMI NORM PARAMETERS: HCPCS | Performed by: INTERNAL MEDICINE

## 2021-01-21 PROCEDURE — G8536 NO DOC ELDER MAL SCRN: HCPCS | Performed by: INTERNAL MEDICINE

## 2021-01-21 PROCEDURE — 99214 OFFICE O/P EST MOD 30 MIN: CPT | Performed by: INTERNAL MEDICINE

## 2021-01-21 PROCEDURE — 3017F COLORECTAL CA SCREEN DOC REV: CPT | Performed by: INTERNAL MEDICINE

## 2021-01-21 PROCEDURE — 1090F PRES/ABSN URINE INCON ASSESS: CPT | Performed by: INTERNAL MEDICINE

## 2021-01-21 PROCEDURE — G8432 DEP SCR NOT DOC, RNG: HCPCS | Performed by: INTERNAL MEDICINE

## 2021-01-21 PROCEDURE — 1101F PT FALLS ASSESS-DOCD LE1/YR: CPT | Performed by: INTERNAL MEDICINE

## 2021-01-21 PROCEDURE — G8427 DOCREV CUR MEDS BY ELIG CLIN: HCPCS | Performed by: INTERNAL MEDICINE

## 2021-01-21 RX ORDER — DEXAMETHASONE 4 MG/1
40 TABLET ORAL
Qty: 40 TAB | Refills: 0 | Status: SHIPPED | OUTPATIENT
Start: 2021-01-21 | End: 2021-02-16 | Stop reason: SDUPTHER

## 2021-01-21 NOTE — PROGRESS NOTES
Adelaidalizbet Wallace is a 78 y.o. female follow up for anemia and thrombocytopenia. 1. Have you been to the ER, urgent care clinic since your last visit? Hospitalized since your last visit? No    2. Have you seen or consulted any other health care providers outside of the [address] [place] [address] since your last visit? Include any pap smears or colon screening.  no

## 2021-02-05 LAB
BASOPHILS # BLD AUTO: 0 X10E3/UL (ref 0–0.2)
BASOPHILS NFR BLD AUTO: 0 %
EOSINOPHIL # BLD AUTO: 0.1 X10E3/UL (ref 0–0.4)
EOSINOPHIL NFR BLD AUTO: 2 %
ERYTHROCYTE [DISTWIDTH] IN BLOOD BY AUTOMATED COUNT: 15.3 % (ref 11.7–15.4)
HCT VFR BLD AUTO: 24.9 % (ref 34–46.6)
HGB BLD-MCNC: 8.5 G/DL (ref 11.1–15.9)
IMM GRANULOCYTES # BLD AUTO: 0 X10E3/UL (ref 0–0.1)
IMM GRANULOCYTES NFR BLD AUTO: 0 %
LYMPHOCYTES # BLD AUTO: 1.2 X10E3/UL (ref 0.7–3.1)
LYMPHOCYTES NFR BLD AUTO: 33 %
MCH RBC QN AUTO: 35 PG (ref 26.6–33)
MCHC RBC AUTO-ENTMCNC: 34.1 G/DL (ref 31.5–35.7)
MCV RBC AUTO: 103 FL (ref 79–97)
MONOCYTES # BLD AUTO: 0.3 X10E3/UL (ref 0.1–0.9)
MONOCYTES NFR BLD AUTO: 8 %
MORPHOLOGY BLD-IMP: ABNORMAL
NEUTROPHILS # BLD AUTO: 2.1 X10E3/UL (ref 1.4–7)
NEUTROPHILS NFR BLD AUTO: 57 %
PLATELET # BLD AUTO: 35 X10E3/UL (ref 150–450)
RBC # BLD AUTO: 2.43 X10E6/UL (ref 3.77–5.28)
WBC # BLD AUTO: 3.8 X10E3/UL (ref 3.4–10.8)

## 2021-02-05 NOTE — PROGRESS NOTES
[address] at [facility]  [phone]    02/05/21- Informed patient of results per  she confirmed she did take dexamethasone as prescribed and finished it last week and is taking oral iron twice weekly. Per  should have labs drawn Monday and Thursday next week and follow up on Friday. She verbalized understanding and denied any further questions or concerns.

## 2021-02-08 ENCOUNTER — TELEPHONE (OUTPATIENT)
Dept: ONCOLOGY | Age: 71
End: 2021-02-08

## 2021-02-08 NOTE — TELEPHONE ENCOUNTER
[address] at [facility]  [phone]    02/08/21- Patient requested to have standing order faxed to Sae [facility]emia[facility] at [address]. Fax sent to [phone], confirmation received.

## 2021-02-08 NOTE — TELEPHONE ENCOUNTER
Patient called and left a  stating that she needed a standing lab order sent to the [facility] on [facility].  # [phone]

## 2021-02-09 LAB
BASOPHILS # BLD AUTO: 0 X10E3/UL (ref 0–0.2)
BASOPHILS NFR BLD AUTO: 0 %
EOSINOPHIL # BLD AUTO: 0.1 X10E3/UL (ref 0–0.4)
EOSINOPHIL NFR BLD AUTO: 4 %
ERYTHROCYTE [DISTWIDTH] IN BLOOD BY AUTOMATED COUNT: 15.1 % (ref 11.7–15.4)
HCT VFR BLD AUTO: 26.3 % (ref 34–46.6)
HGB BLD-MCNC: 8.7 G/DL (ref 11.1–15.9)
IMM GRANULOCYTES # BLD AUTO: 0 X10E3/UL (ref 0–0.1)
IMM GRANULOCYTES NFR BLD AUTO: 0 %
LYMPHOCYTES # BLD AUTO: 1 X10E3/UL (ref 0.7–3.1)
LYMPHOCYTES NFR BLD AUTO: 35 %
MCH RBC QN AUTO: 34.4 PG (ref 26.6–33)
MCHC RBC AUTO-ENTMCNC: 33.1 G/DL (ref 31.5–35.7)
MCV RBC AUTO: 104 FL (ref 79–97)
MONOCYTES # BLD AUTO: 0.2 X10E3/UL (ref 0.1–0.9)
MONOCYTES NFR BLD AUTO: 8 %
MORPHOLOGY BLD-IMP: ABNORMAL
NEUTROPHILS # BLD AUTO: 1.5 X10E3/UL (ref 1.4–7)
NEUTROPHILS NFR BLD AUTO: 53 %
PLATELET # BLD AUTO: 28 X10E3/UL (ref 150–450)
RBC # BLD AUTO: 2.53 X10E6/UL (ref 3.77–5.28)
WBC # BLD AUTO: 2.8 X10E3/UL (ref 3.4–10.8)

## 2021-02-12 ENCOUNTER — VIRTUAL VISIT (OUTPATIENT)
Dept: ONCOLOGY | Age: 71
End: 2021-02-12
Payer: MEDICARE

## 2021-02-12 DIAGNOSIS — D69.6 THROMBOCYTOPENIA (HCC): ICD-10-CM

## 2021-02-12 DIAGNOSIS — D47.2 MGUS (MONOCLONAL GAMMOPATHY OF UNKNOWN SIGNIFICANCE): Primary | ICD-10-CM

## 2021-02-12 LAB
BASOPHILS # BLD AUTO: 0 X10E3/UL (ref 0–0.2)
BASOPHILS NFR BLD AUTO: 0 %
EOSINOPHIL # BLD AUTO: 0.1 X10E3/UL (ref 0–0.4)
EOSINOPHIL NFR BLD AUTO: 2 %
ERYTHROCYTE [DISTWIDTH] IN BLOOD BY AUTOMATED COUNT: 15.2 % (ref 11.7–15.4)
HCT VFR BLD AUTO: 25.5 % (ref 34–46.6)
HGB BLD-MCNC: 8.7 G/DL (ref 11.1–15.9)
IMM GRANULOCYTES # BLD AUTO: 0 X10E3/UL (ref 0–0.1)
IMM GRANULOCYTES NFR BLD AUTO: 0 %
LYMPHOCYTES # BLD AUTO: 1 X10E3/UL (ref 0.7–3.1)
LYMPHOCYTES NFR BLD AUTO: 30 %
MCH RBC QN AUTO: 34.8 PG (ref 26.6–33)
MCHC RBC AUTO-ENTMCNC: 34.1 G/DL (ref 31.5–35.7)
MCV RBC AUTO: 102 FL (ref 79–97)
MONOCYTES # BLD AUTO: 0.3 X10E3/UL (ref 0.1–0.9)
MONOCYTES NFR BLD AUTO: 9 %
MORPHOLOGY BLD-IMP: ABNORMAL
NEUTROPHILS # BLD AUTO: 1.9 X10E3/UL (ref 1.4–7)
NEUTROPHILS NFR BLD AUTO: 59 %
PLATELET # BLD AUTO: ABNORMAL X10E3/UL (ref 150–450)
RBC # BLD AUTO: 2.5 X10E6/UL (ref 3.77–5.28)
WBC # BLD AUTO: 3.3 X10E3/UL (ref 3.4–10.8)

## 2021-02-12 PROCEDURE — 99214 OFFICE O/P EST MOD 30 MIN: CPT | Performed by: INTERNAL MEDICINE

## 2021-02-12 RX ORDER — LANOLIN ALCOHOL/MO/W.PET/CERES
CREAM (GRAM) TOPICAL
COMMUNITY

## 2021-02-12 NOTE — PROGRESS NOTES
Marie Wilcox is a 78 y.o. female follow up for anemia and thrombocytopenia. 1. Have you been to the ER, urgent care clinic since your last visit? Hospitalized since your last visit?no    2. Have you seen or consulted any other health care providers outside of the [address] [place] [address] since your last visit? Include any pap smears or colon screening.  no

## 2021-02-15 ENCOUNTER — TELEPHONE (OUTPATIENT)
Dept: ONCOLOGY | Age: 71
End: 2021-02-15

## 2021-02-15 NOTE — TELEPHONE ENCOUNTER
[address] at [facility]  [phone]    02/15/21- Keturah Maloney, from [facility] [phone] - [address] [facility] called to get clarification on what orders are needed to be drawn today. Advised her CBC with diff and PLATELET COUNT ON CITRATED BLD, [facility] # [id number] needed. She confirmed both can be drawn and denied any further questions or concerns. 1:57 PM- Patient called to report she was only able to have CBC drawn today at [email or web address]. Apparently lab wilma advised her they no longer can check platelet count on citrated tube. Patient is willing to come to [facility] lab to have test completed. Order in [facility]. Confirmed with [facility] lab they are able to draw lab- patient informed.

## 2021-02-16 ENCOUNTER — TELEPHONE (OUTPATIENT)
Dept: ONCOLOGY | Age: 71
End: 2021-02-16

## 2021-02-16 DIAGNOSIS — D69.3 CHRONIC ITP (IDIOPATHIC THROMBOCYTOPENIA) (HCC): ICD-10-CM

## 2021-02-16 LAB
BASOPHILS # BLD AUTO: 0 X10E3/UL (ref 0–0.2)
BASOPHILS NFR BLD AUTO: 0 %
EOSINOPHIL # BLD AUTO: 0.1 X10E3/UL (ref 0–0.4)
EOSINOPHIL NFR BLD AUTO: 2 %
ERYTHROCYTE [DISTWIDTH] IN BLOOD BY AUTOMATED COUNT: 15.6 % (ref 11.7–15.4)
HCT VFR BLD AUTO: 24.3 % (ref 34–46.6)
HGB BLD-MCNC: 8.3 G/DL (ref 11.1–15.9)
IMM GRANULOCYTES # BLD AUTO: 0 X10E3/UL (ref 0–0.1)
IMM GRANULOCYTES NFR BLD AUTO: 0 %
LYMPHOCYTES # BLD AUTO: 1 X10E3/UL (ref 0.7–3.1)
LYMPHOCYTES NFR BLD AUTO: 39 %
MCH RBC QN AUTO: 35.2 PG (ref 26.6–33)
MCHC RBC AUTO-ENTMCNC: 34.2 G/DL (ref 31.5–35.7)
MCV RBC AUTO: 103 FL (ref 79–97)
MONOCYTES # BLD AUTO: 0.2 X10E3/UL (ref 0.1–0.9)
MONOCYTES NFR BLD AUTO: 9 %
MORPHOLOGY BLD-IMP: ABNORMAL
NEUTROPHILS # BLD AUTO: 1.3 X10E3/UL (ref 1.4–7)
NEUTROPHILS NFR BLD AUTO: 50 %
NRBC BLD AUTO-RTO: 1 % (ref 0–0)
PLATELET # BLD AUTO: 20 X10E3/UL (ref 150–450)
RBC # BLD AUTO: 2.36 X10E6/UL (ref 3.77–5.28)
WBC # BLD AUTO: 2.6 X10E3/UL (ref 3.4–10.8)

## 2021-02-16 RX ORDER — SODIUM CHLORIDE 0.9 % (FLUSH) 0.9 %
5-10 SYRINGE (ML) INJECTION AS NEEDED
Status: CANCELLED | OUTPATIENT
Start: 2021-03-11

## 2021-02-16 RX ORDER — DIPHENHYDRAMINE HYDROCHLORIDE 50 MG/ML
50 INJECTION, SOLUTION INTRAMUSCULAR; INTRAVENOUS ONCE
Status: CANCELLED
Start: 2021-02-18 | End: 2021-02-17

## 2021-02-16 RX ORDER — ALBUTEROL SULFATE 0.83 MG/ML
2.5 SOLUTION RESPIRATORY (INHALATION) AS NEEDED
Status: CANCELLED
Start: 2021-02-18

## 2021-02-16 RX ORDER — DEXAMETHASONE 4 MG/1
40 TABLET ORAL
Qty: 40 TAB | Refills: 0 | Status: SHIPPED | OUTPATIENT
Start: 2021-02-16 | End: 2021-02-20

## 2021-02-16 RX ORDER — HEPARIN 100 UNIT/ML
500 SYRINGE INTRAVENOUS AS NEEDED
Status: CANCELLED | OUTPATIENT
Start: 2021-03-22

## 2021-02-16 RX ORDER — SODIUM CHLORIDE 9 MG/ML
10 INJECTION INTRAMUSCULAR; INTRAVENOUS; SUBCUTANEOUS AS NEEDED
Status: CANCELLED | OUTPATIENT
Start: 2021-02-18

## 2021-02-16 RX ORDER — SODIUM CHLORIDE 0.9 % (FLUSH) 0.9 %
5-10 SYRINGE (ML) INJECTION AS NEEDED
Status: CANCELLED | OUTPATIENT
Start: 2021-02-22

## 2021-02-16 RX ORDER — SODIUM CHLORIDE 0.9 % (FLUSH) 0.9 %
5-10 SYRINGE (ML) INJECTION AS NEEDED
Status: CANCELLED | OUTPATIENT
Start: 2021-02-25

## 2021-02-16 RX ORDER — DIPHENHYDRAMINE HYDROCHLORIDE 50 MG/ML
50 INJECTION, SOLUTION INTRAMUSCULAR; INTRAVENOUS AS NEEDED
Status: CANCELLED
Start: 2021-02-18

## 2021-02-16 RX ORDER — HEPARIN 100 UNIT/ML
500 SYRINGE INTRAVENOUS AS NEEDED
Status: CANCELLED | OUTPATIENT
Start: 2021-02-25

## 2021-02-16 RX ORDER — HEPARIN 100 UNIT/ML
500 SYRINGE INTRAVENOUS AS NEEDED
Status: CANCELLED | OUTPATIENT
Start: 2021-02-22

## 2021-02-16 RX ORDER — DIPHENHYDRAMINE HYDROCHLORIDE 50 MG/ML
25 INJECTION, SOLUTION INTRAMUSCULAR; INTRAVENOUS AS NEEDED
Status: CANCELLED
Start: 2021-02-18

## 2021-02-16 RX ORDER — HYDROCORTISONE SODIUM SUCCINATE 100 MG/2ML
100 INJECTION, POWDER, FOR SOLUTION INTRAMUSCULAR; INTRAVENOUS AS NEEDED
Status: CANCELLED | OUTPATIENT
Start: 2021-02-18

## 2021-02-16 RX ORDER — HEPARIN 100 UNIT/ML
300-500 SYRINGE INTRAVENOUS AS NEEDED
Status: CANCELLED
Start: 2021-02-18

## 2021-02-16 RX ORDER — ONDANSETRON 2 MG/ML
8 INJECTION INTRAMUSCULAR; INTRAVENOUS AS NEEDED
Status: CANCELLED | OUTPATIENT
Start: 2021-02-18

## 2021-02-16 RX ORDER — SODIUM CHLORIDE 9 MG/ML
10 INJECTION INTRAMUSCULAR; INTRAVENOUS; SUBCUTANEOUS AS NEEDED
Status: CANCELLED | OUTPATIENT
Start: 2021-03-11

## 2021-02-16 RX ORDER — HEPARIN 100 UNIT/ML
500 SYRINGE INTRAVENOUS AS NEEDED
Status: CANCELLED | OUTPATIENT
Start: 2021-03-11

## 2021-02-16 RX ORDER — SODIUM CHLORIDE 0.9 % (FLUSH) 0.9 %
10 SYRINGE (ML) INJECTION AS NEEDED
Status: CANCELLED | OUTPATIENT
Start: 2021-02-18

## 2021-02-16 RX ORDER — SODIUM CHLORIDE 9 MG/ML
10 INJECTION INTRAMUSCULAR; INTRAVENOUS; SUBCUTANEOUS AS NEEDED
Status: CANCELLED | OUTPATIENT
Start: 2021-02-25

## 2021-02-16 RX ORDER — ACETAMINOPHEN 325 MG/1
650 TABLET ORAL ONCE
Status: CANCELLED
Start: 2021-02-18 | End: 2021-02-17

## 2021-02-16 RX ORDER — SODIUM CHLORIDE 9 MG/ML
10 INJECTION INTRAMUSCULAR; INTRAVENOUS; SUBCUTANEOUS AS NEEDED
Status: CANCELLED | OUTPATIENT
Start: 2021-03-25

## 2021-02-16 RX ORDER — SODIUM CHLORIDE 9 MG/ML
10 INJECTION INTRAMUSCULAR; INTRAVENOUS; SUBCUTANEOUS AS NEEDED
Status: CANCELLED | OUTPATIENT
Start: 2021-03-22

## 2021-02-16 RX ORDER — SODIUM CHLORIDE 9 MG/ML
10 INJECTION INTRAMUSCULAR; INTRAVENOUS; SUBCUTANEOUS AS NEEDED
Status: CANCELLED | OUTPATIENT
Start: 2021-02-22

## 2021-02-16 RX ORDER — EPINEPHRINE 1 MG/ML
0.3 INJECTION, SOLUTION, CONCENTRATE INTRAVENOUS AS NEEDED
Status: CANCELLED | OUTPATIENT
Start: 2021-02-18

## 2021-02-16 RX ORDER — SODIUM CHLORIDE 0.9 % (FLUSH) 0.9 %
5-10 SYRINGE (ML) INJECTION AS NEEDED
Status: CANCELLED | OUTPATIENT
Start: 2021-03-22

## 2021-02-16 RX ORDER — SODIUM CHLORIDE 0.9 % (FLUSH) 0.9 %
5-10 SYRINGE (ML) INJECTION AS NEEDED
Status: CANCELLED | OUTPATIENT
Start: 2021-03-25

## 2021-02-16 RX ORDER — HEPARIN 100 UNIT/ML
500 SYRINGE INTRAVENOUS AS NEEDED
Status: CANCELLED | OUTPATIENT
Start: 2021-03-25

## 2021-02-16 RX ORDER — ACETAMINOPHEN 325 MG/1
650 TABLET ORAL AS NEEDED
Status: CANCELLED
Start: 2021-02-18

## 2021-02-16 NOTE — TELEPHONE ENCOUNTER
[employer] at [facility]  [phone]    02/16/21- Izzy Lo from [facility] called to report critical platelets of 20, and RBC 2.36. Dr. Darek Hameed notified.

## 2021-02-16 NOTE — TELEPHONE ENCOUNTER
[employer] at [facility]  [phone]    Labs from 2/15/2021 reviewed. WBC: 2.6  HGB: 8.3  PLT: 20  ANC: 1.3  MCV: 103    Her pancytopenia and lack of response to steroids has me concerned about a bone marrow pathology, though her prior bone marrow biopsy was negative. We will move forward with another round of steroids and administer IVIG as well. If no response, I will repeat her bone marrow biopsy. I called and discussed this with her, she is in agreement. Check CBC on day of IVIG, and BIW in the [facility]. Consider PLT transfusion if PLT worsen and she has recurrent bleeding.

## 2021-02-18 ENCOUNTER — HOSPITAL ENCOUNTER (OUTPATIENT)
Dept: INFUSION THERAPY | Age: 71
Discharge: HOME | End: 2021-02-18
Payer: MEDICARE

## 2021-02-18 VITALS
HEART RATE: 87 BPM | RESPIRATION RATE: 18 BRPM | WEIGHT: 136 LBS | SYSTOLIC BLOOD PRESSURE: 110 MMHG | BODY MASS INDEX: 23.22 KG/M2 | TEMPERATURE: 97.6 F | HEIGHT: 64 IN | DIASTOLIC BLOOD PRESSURE: 54 MMHG

## 2021-02-18 DIAGNOSIS — D69.3 ACUTE ITP (HCC): Primary | ICD-10-CM

## 2021-02-18 LAB
ANION GAP SERPL CALC-SCNC: 7 MMOL/L (ref 5–15)
BASOPHILS # BLD: 0 K/UL (ref 0–0.1)
BASOPHILS NFR BLD: 0 % (ref 0–1)
BUN SERPL-MCNC: 22 MG/DL (ref 6–20)
BUN/CREAT SERPL: 21 (ref 12–20)
CALCIUM SERPL-MCNC: 9.8 MG/DL (ref 8.5–10.1)
CHLORIDE SERPL-SCNC: 106 MMOL/L (ref 97–108)
CO2 SERPL-SCNC: 28 MMOL/L (ref 21–32)
COMMENT, HOLDF: NORMAL
CREAT SERPL-MCNC: 1.03 MG/DL (ref 0.55–1.02)
DIFFERENTIAL METHOD BLD: ABNORMAL
EOSINOPHIL # BLD: 0 K/UL (ref 0–0.4)
EOSINOPHIL NFR BLD: 0 % (ref 0–7)
ERYTHROCYTE [DISTWIDTH] IN BLOOD BY AUTOMATED COUNT: 14.2 % (ref 11.5–14.5)
GLUCOSE SERPL-MCNC: 92 MG/DL (ref 65–100)
HCT VFR BLD AUTO: 24.3 % (ref 35–47)
HGB BLD-MCNC: 8.2 G/DL (ref 11.5–16)
IMM GRANULOCYTES # BLD AUTO: 0 K/UL (ref 0–0.04)
IMM GRANULOCYTES NFR BLD AUTO: 0 % (ref 0–0.5)
LYMPHOCYTES # BLD: 1 K/UL (ref 0.8–3.5)
LYMPHOCYTES NFR BLD: 18 % (ref 12–49)
MCH RBC QN AUTO: 33.6 PG (ref 26–34)
MCHC RBC AUTO-ENTMCNC: 33.7 G/DL (ref 30–36.5)
MCV RBC AUTO: 99.6 FL (ref 80–99)
MONOCYTES # BLD: 0.4 K/UL (ref 0–1)
MONOCYTES NFR BLD: 8 % (ref 5–13)
NEUTS SEG # BLD: 3.9 K/UL (ref 1.8–8)
NEUTS SEG NFR BLD: 74 % (ref 32–75)
NRBC # BLD: 0.03 K/UL (ref 0–0.01)
NRBC BLD-RTO: 0.6 PER 100 WBC
PLATELET # BLD AUTO: 14 K/UL (ref 150–400)
PLATELET # BLD AUTO: 25 K/UL (ref 150–400)
POTASSIUM SERPL-SCNC: 3.6 MMOL/L (ref 3.5–5.1)
RBC # BLD AUTO: 2.44 M/UL (ref 3.8–5.2)
RBC MORPH BLD: ABNORMAL
RBC MORPH BLD: ABNORMAL
SAMPLES BEING HELD,HOLD: NORMAL
SODIUM SERPL-SCNC: 141 MMOL/L (ref 136–145)
WBC # BLD AUTO: 5.3 K/UL (ref 3.6–11)

## 2021-02-18 PROCEDURE — 96366 THER/PROPH/DIAG IV INF ADDON: CPT

## 2021-02-18 PROCEDURE — 96375 TX/PRO/DX INJ NEW DRUG ADDON: CPT

## 2021-02-18 PROCEDURE — 74011250636 HC RX REV CODE- 250/636: Performed by: NURSE PRACTITIONER

## 2021-02-18 PROCEDURE — 85025 COMPLETE CBC W/AUTO DIFF WBC: CPT

## 2021-02-18 PROCEDURE — 80048 BASIC METABOLIC PNL TOTAL CA: CPT

## 2021-02-18 PROCEDURE — 74011250637 HC RX REV CODE- 250/637: Performed by: NURSE PRACTITIONER

## 2021-02-18 PROCEDURE — 85049 AUTOMATED PLATELET COUNT: CPT

## 2021-02-18 PROCEDURE — 36415 COLL VENOUS BLD VENIPUNCTURE: CPT

## 2021-02-18 PROCEDURE — 96365 THER/PROPH/DIAG IV INF INIT: CPT

## 2021-02-18 RX ORDER — SODIUM CHLORIDE 0.9 % (FLUSH) 0.9 %
10 SYRINGE (ML) INJECTION AS NEEDED
Status: DISPENSED | OUTPATIENT
Start: 2021-02-18 | End: 2021-02-18

## 2021-02-18 RX ORDER — SODIUM CHLORIDE 9 MG/ML
10 INJECTION INTRAMUSCULAR; INTRAVENOUS; SUBCUTANEOUS AS NEEDED
Status: ACTIVE | OUTPATIENT
Start: 2021-02-18 | End: 2021-02-18

## 2021-02-18 RX ORDER — ACETAMINOPHEN 325 MG/1
650 TABLET ORAL ONCE
Status: COMPLETED | OUTPATIENT
Start: 2021-02-18 | End: 2021-02-18

## 2021-02-18 RX ORDER — DIPHENHYDRAMINE HYDROCHLORIDE 50 MG/ML
50 INJECTION, SOLUTION INTRAMUSCULAR; INTRAVENOUS ONCE
Status: COMPLETED | OUTPATIENT
Start: 2021-02-18 | End: 2021-02-18

## 2021-02-18 RX ORDER — HEPARIN 100 UNIT/ML
300-500 SYRINGE INTRAVENOUS AS NEEDED
Status: ACTIVE | OUTPATIENT
Start: 2021-02-18 | End: 2021-02-18

## 2021-02-18 RX ADMIN — ACETAMINOPHEN 650 MG: 325 TABLET ORAL at 10:28

## 2021-02-18 RX ADMIN — DIPHENHYDRAMINE HYDROCHLORIDE 50 MG: 50 INJECTION INTRAMUSCULAR; INTRAVENOUS at 10:28

## 2021-02-18 RX ADMIN — IMMUNE GLOBULIN INTRAVENOUS (HUMAN) 60 G: 5 INJECTION, SOLUTION INTRAVENOUS at 10:46

## 2021-02-18 RX ADMIN — SODIUM CHLORIDE 500 ML: 900 INJECTION, SOLUTION INTRAVENOUS at 10:29

## 2021-02-18 NOTE — PROGRESS NOTES
[facility] Progress Note    Date: 2021    Name: Vilma Calderon    MRN: [id number]         : 1950    Ms. Calderon Arrived ambulatory and in no distress for IVIG of Regimen.  Assessment was completed, no acute issues at this time, no new complaints voiced.  PIV established without difficulty, labs drawn & sent for processing.    Ms. Calderon's vitals were reviewed.  Visit Vitals  /68   Pulse (!) 105   Temp 97.6 °F (36.4 °C)   Resp 18   Breastfeeding No     Medications Administered     acetaminophen (TYLENOL) tablet 650 mg     Admin Date  2021 Action  Given Dose  650 mg Route  Oral Administered By  Lavinia Handley RN          diphenhydrAMINE (BENADRYL) injection 50 mg     Admin Date  2021 Action  Given Dose  50 mg Route  IntraVENous Administered By  Lavinia Handley RN          immune globulin 10% (FLEBOGAMMA DIF) infusion 60 g     Admin Date  2021 Action  New Bag Dose  60 g Rate  37 mL/hr Route  IntraVENous Administered By  Lavinia Handley RN          sodium chloride 0.9 % bolus infusion 500 mL     Admin Date  2021 Action  New Bag Dose  500 mL Route  IntraVENous Administered By  Lavinia Handley, LIZBETH                  Recent Results (from the past 12 hour(s))   CBC WITH AUTOMATED DIFF    Collection Time: 21  8:53 AM   Result Value Ref Range    WBC 5.3 3.6 - 11.0 K/uL    RBC 2.44 (L) 3.80 - 5.20 M/uL    HGB 8.2 (L) 11.5 - 16.0 g/dL    HCT 24.3 (L) 35.0 - 47.0 %    MCV 99.6 (H) 80.0 - 99.0 FL    MCH 33.6 26.0 - 34.0 PG    MCHC 33.7 30.0 - 36.5 g/dL    RDW 14.2 11.5 - 14.5 %    PLATELET 25 (LL) 150 - 400 K/uL    NRBC 0.6 (H) 0  WBC    ABSOLUTE NRBC 0.03 (H) 0.00 - 0.01 K/uL    NEUTROPHILS 74 32 - 75 %    LYMPHOCYTES 18 12 - 49 %    MONOCYTES 8 5 - 13 %    EOSINOPHILS 0 0 - 7 %    BASOPHILS 0 0 - 1 %    IMMATURE GRANULOCYTES 0 0.0 - 0.5 %    ABS. NEUTROPHILS 3.9 1.8 - 8.0 K/UL    ABS. LYMPHOCYTES 1.0 0.8 - 3.5 K/UL    ABS. MONOCYTES 0.4  0.0 - 1.0 K/UL    ABS. EOSINOPHILS 0.0 0.0 - 0.4 K/UL    ABS. BASOPHILS 0.0 0.0 - 0.1 K/UL    ABS. IMM. GRANS. 0.0 0.00 - 0.04 K/UL    DF SMEAR SCANNED      RBC COMMENTS MACROCYTOSIS  1+        RBC COMMENTS OVALOCYTES  PRESENT       METABOLIC PANEL, BASIC    Collection Time: 02/18/21  8:53 AM   Result Value Ref Range    Sodium 141 136 - 145 mmol/L    Potassium 3.6 3.5 - 5.1 mmol/L    Chloride 106 97 - 108 mmol/L    CO2 28 21 - 32 mmol/L    Anion gap 7 5 - 15 mmol/L    Glucose 92 65 - 100 mg/dL    BUN 22 (H) 6 - 20 MG/DL    Creatinine 1.03 (H) 0.55 - 1.02 MG/DL    BUN/Creatinine ratio 21 (H) 12 - 20      GFR est AA >60 >60 ml/min/1.73m2    GFR est non-AA 53 (L) >60 ml/min/1.73m2    Calcium 9.8 8.5 - 10.1 MG/DL   PLATELET COUNT    Collection Time: 02/18/21  8:53 AM   Result Value Ref Range    PLATELET 14 (LL) 304 - 400 K/uL   SAMPLES BEING HELD    Collection Time: 02/18/21  8:53 AM   Result Value Ref Range    SAMPLES BEING HELD 1BLU     COMMENT        Add-on orders for these samples will be processed based on acceptable specimen integrity and analyte stability, which may vary by analyte. Ms. Caleb Noble tolerated treatment well and was discharged from [place] [address] in stable condition at 1430. Port de-accessed, flushed & heparinized per protocol.  She is to return on   Future Appointments   Date Time Provider Keturah Liu   2/22/2021 10:30 AM [facility]   2/25/2021 11:30 AM [facility]   3/1/2021 11:00 AM [facility]3 [facility]   3/4/2021 11:00 AM [facility]3 [facility]   3/8/2021 11:00 AM [facility]3 [facility]   3/11/2021 11:00 AM [facility]3 [facility]   6/1/2021  9:00 AM [facility] ER [address], RN  February 18, 2021

## 2021-02-22 ENCOUNTER — HOSPITAL ENCOUNTER (OUTPATIENT)
Dept: INFUSION THERAPY | Age: 71
Discharge: HOME | End: 2021-02-22
Payer: COMMERCIAL

## 2021-02-22 VITALS
OXYGEN SATURATION: 99 % | DIASTOLIC BLOOD PRESSURE: 65 MMHG | HEART RATE: 89 BPM | RESPIRATION RATE: 18 BRPM | TEMPERATURE: 97.3 F | SYSTOLIC BLOOD PRESSURE: 116 MMHG

## 2021-02-22 LAB
BASOPHILS # BLD: 0 K/UL (ref 0–0.1)
BASOPHILS NFR BLD: 0 % (ref 0–1)
DIFFERENTIAL METHOD BLD: ABNORMAL
EOSINOPHIL # BLD: 0 K/UL (ref 0–0.4)
EOSINOPHIL NFR BLD: 1 % (ref 0–7)
ERYTHROCYTE [DISTWIDTH] IN BLOOD BY AUTOMATED COUNT: 14.6 % (ref 11.5–14.5)
HCT VFR BLD AUTO: 26.5 % (ref 35–47)
HGB BLD-MCNC: 8.9 G/DL (ref 11.5–16)
IMM GRANULOCYTES # BLD AUTO: 0 K/UL (ref 0–0.04)
IMM GRANULOCYTES NFR BLD AUTO: 1 % (ref 0–0.5)
LYMPHOCYTES # BLD: 1.3 K/UL (ref 0.8–3.5)
LYMPHOCYTES NFR BLD: 31 % (ref 12–49)
MCH RBC QN AUTO: 33.6 PG (ref 26–34)
MCHC RBC AUTO-ENTMCNC: 33.6 G/DL (ref 30–36.5)
MCV RBC AUTO: 100 FL (ref 80–99)
MONOCYTES # BLD: 0.3 K/UL (ref 0–1)
MONOCYTES NFR BLD: 8 % (ref 5–13)
NEUTS SEG # BLD: 2.7 K/UL (ref 1.8–8)
NEUTS SEG NFR BLD: 59 % (ref 32–75)
NRBC # BLD: 0.03 K/UL (ref 0–0.01)
NRBC BLD-RTO: 0.7 PER 100 WBC
PLATELET # BLD AUTO: 21 K/UL (ref 150–400)
RBC # BLD AUTO: 2.65 M/UL (ref 3.8–5.2)
RBC MORPH BLD: ABNORMAL
WBC # BLD AUTO: 4.3 K/UL (ref 3.6–11)

## 2021-02-22 PROCEDURE — 85025 COMPLETE CBC W/AUTO DIFF WBC: CPT

## 2021-02-22 PROCEDURE — 36415 COLL VENOUS BLD VENIPUNCTURE: CPT

## 2021-02-22 NOTE — PROGRESS NOTES
02/22/21- Informed patient of lab results per Dr. Jordy Hendricks. Will check again Thursday as planned. Follow up virtual visit scheduled for 2/26 at 1 pm.  Patient verbalized understanding, and confirmed that she did finish dexamethasone last Friday 2/19. No further questions or concerns at this time.

## 2021-02-22 NOTE — PROGRESS NOTES
[facility] -     1019 - Patient, escorted by ,  presented to [facility] for labs. 1030 - Labs were drawn and sent for processing. Patient tolerated lab draw and voiced no complaints. Patient denies having experienced any COVID symptoms or having any known exposure to COVID      Vitals -     Temp -97.3 (Oral)  BP - 116/65  O2% - 99  Pulse - 89  Resp.  - 18

## 2021-02-23 ENCOUNTER — APPOINTMENT (OUTPATIENT)
Dept: INFUSION THERAPY | Age: 71
End: 2021-02-23

## 2021-02-25 ENCOUNTER — HOSPITAL ENCOUNTER (OUTPATIENT)
Dept: INFUSION THERAPY | Age: 71
Discharge: HOME | End: 2021-02-25
Payer: COMMERCIAL

## 2021-02-25 VITALS
RESPIRATION RATE: 18 BRPM | OXYGEN SATURATION: 99 % | TEMPERATURE: 96.9 F | DIASTOLIC BLOOD PRESSURE: 61 MMHG | HEART RATE: 97 BPM | SYSTOLIC BLOOD PRESSURE: 90 MMHG

## 2021-02-25 LAB
BASOPHILS # BLD: 0 K/UL (ref 0–0.1)
BASOPHILS NFR BLD: 0 % (ref 0–1)
DIFFERENTIAL METHOD BLD: ABNORMAL
EOSINOPHIL # BLD: 0 K/UL (ref 0–0.4)
EOSINOPHIL NFR BLD: 0 % (ref 0–7)
ERYTHROCYTE [DISTWIDTH] IN BLOOD BY AUTOMATED COUNT: 14.6 % (ref 11.5–14.5)
HCT VFR BLD AUTO: 24.5 % (ref 35–47)
HGB BLD-MCNC: 8.3 G/DL (ref 11.5–16)
IMM GRANULOCYTES # BLD AUTO: 0 K/UL
IMM GRANULOCYTES NFR BLD AUTO: 0 %
LYMPHOCYTES # BLD: 1.3 K/UL (ref 0.8–3.5)
LYMPHOCYTES NFR BLD: 32 % (ref 12–49)
MCH RBC QN AUTO: 33.6 PG (ref 26–34)
MCHC RBC AUTO-ENTMCNC: 33.9 G/DL (ref 30–36.5)
MCV RBC AUTO: 99.2 FL (ref 80–99)
MONOCYTES # BLD: 0.1 K/UL (ref 0–1)
MONOCYTES NFR BLD: 3 % (ref 5–13)
NEUTS SEG # BLD: 2.7 K/UL (ref 1.8–8)
NEUTS SEG NFR BLD: 65 % (ref 32–75)
NRBC # BLD: 0.02 K/UL (ref 0–0.01)
NRBC BLD-RTO: 0.5 PER 100 WBC
PLATELET # BLD AUTO: 21 K/UL (ref 150–400)
RBC # BLD AUTO: 2.47 M/UL (ref 3.8–5.2)
RBC MORPH BLD: ABNORMAL
WBC # BLD AUTO: 4.1 K/UL (ref 3.6–11)

## 2021-02-25 PROCEDURE — 36415 COLL VENOUS BLD VENIPUNCTURE: CPT

## 2021-02-25 PROCEDURE — 85025 COMPLETE CBC W/AUTO DIFF WBC: CPT

## 2021-02-25 NOTE — PROGRESS NOTES
[facility] at [address], [address] [place] [address] [place]: [phone]  F: [phone]      Reason for Visit:   Ishmael Dicekrson is a 78 y.o. female who is seen for follow up of anemia and thrombocytopenia. History of Present Illness:   She received IVIG on 2/18, along with another course of pulse dose dexamethasone 40mg PO daily for 4 days, completed 2/20/2021. Unfortunately, her platelets have not responded at all to therapy, remaining at 21k on 2/22 and 2/25. She denies any recent bleeding. Nose bleeds have not recurred. She remains on oral iron TIW. She is having      She is a retired nurse. Review of systems was obtained and pertinent findings reviewed above. Past medical history, social history, family history, medications, and allergies are located in the electronic medical record. Physical Exam:   There were no vitals taken for this visit. General: alert, cooperative, no distress   Mental  status: normal mood, behavior, speech, dress, motor activity, and thought processes, able to follow commands   HENT: NCAT   Neck: no visualized mass   Resp: no respiratory distress   Neuro: no gross deficits   Skin: no discoloration or lesions of concern on visible areas   Psychiatric: normal affect, consistent with stated mood, no evidence of hallucinations       Due to this being a TeleHealth evaluation (During [place] [address] public health emergency), many elements of the physical examination are unable to be assessed. Evaluation of the following organ systems was limited: Vitals/Constitutional/EENT/Resp/CV/GI//MS/Neuro/Skin/Heme-Lymph-Imm. Results:     Lab Results   Component Value Date/Time    WBC 4.1 02/25/2021 11:12 AM    HGB 8.3 (L) 02/25/2021 11:12 AM    HCT 24.5 (L) 02/25/2021 11:12 AM    PLATELET 21 (LL) 88/75/6031 11:12 AM    MCV 99.2 (H) 02/25/2021 11:12 AM    ABS.  NEUTROPHILS 2.7 02/25/2021 11:12 AM     Lab Results   Component Value Date/Time    Sodium 141 02/18/2021 08:53 AM    Potassium 3.6 02/18/2021 08:53 AM    Chloride 106 02/18/2021 08:53 AM    CO2 28 02/18/2021 08:53 AM    Glucose 92 02/18/2021 08:53 AM    BUN 22 (H) 02/18/2021 08:53 AM    Creatinine 1.03 (H) 02/18/2021 08:53 AM    GFR est AA >60 02/18/2021 08:53 AM    GFR est non-AA 53 (L) 02/18/2021 08:53 AM    Calcium 9.8 02/18/2021 08:53 AM     Lab Results   Component Value Date/Time    Bilirubin, total 0.3 05/20/2020 01:04 PM    ALT (SGPT) 27 05/20/2020 01:04 PM    Alk. phosphatase 79 05/20/2020 01:04 PM    Protein, total 6.8 09/03/2020 03:08 PM    Albumin 4.5 09/03/2020 03:08 PM    Globulin 3.0 05/20/2020 01:04 PM     Lab Results   Component Value Date/Time    Reticulocyte count 2.2 (H) 05/20/2020 01:04 PM    Iron % saturation 38 01/14/2021 01:50 PM    TIBC 268 01/14/2021 01:50 PM    Ferritin 131 01/14/2021 01:50 PM    Vitamin B12 820 05/20/2020 01:04 PM    Folate 16.7 05/20/2020 01:04 PM    Haptoglobin 155 05/20/2020 01:04 PM     (H) 05/20/2020 01:04 PM    TSH 0.30 (L) 06/29/2016 10:46 AM    M-Gabriele Not Observed 09/03/2020 03:08 PM    JOSE, Direct Positive (A) 05/20/2020 01:04 PM    Hep C virus Ab Interp.  NONREACTIVE 05/20/2020 01:01 PM     PLATELET   Date Value   02/25/2021 21 K/uL (LL)   02/22/2021 21 K/uL (LL)   02/18/2021 25 K/uL (LL)   02/18/2021 14 K/uL (LL)   02/15/2021 20 x10E3/uL (<)   02/11/2021 Comment x10E3/uL   02/08/2021 28 x10E3/uL (LL)   02/04/2021 35 x10E3/uL (LL)   01/14/2021 40 x10E3/uL (LL)   12/10/2020 50 x10E3/uL (LL)   09/03/2020 53 x10E3/uL (LL)   06/04/2020 63 K/uL (L)   05/20/2020 69 K/uL (L)   06/29/2016 177 K/uL   05/11/2010 252 K/uL       M-SPIKE  Recent Labs     09/03/20  1508 06/15/20  1145 05/20/20  1304   PE6T Not Observed Not Observed Not Observed       Free Kappa Light Chains  Recent Labs     09/03/20  1508 06/15/20  1145   KLFL1L 24.2* 23.6*       Free Lambda Light Chains  Recent Labs     09/03/20  1508 06/15/20  1145   KLFL2L 15.9 13.4       Light Chain Ratio  Recent Labs     09/03/20  1508 06/15/20  1145   KLFL3L 1.52 1.76*       UPEP M-spike  Recent Labs     09/08/20  0915   IEU8L Not Observed       8/15/2019  WBC: 5.8  HGB: 12.9  PLT: 138    9/5/2019  WBC: 5.2  HGB: 12.4  PLT: 128    5/1/2020  WBC: 5.2  HGB: 11.6  PLT: 65    5/20/2020:  Smear: Thrombocytopenia.  Normochromic normocytic red blood cells.  Unremarkable white blood cells.  HPylori ab: negative  MARILYN: IgG monoclonal protein with kappa light chain specificity    US abdomen 7/13/2015:  Liver and spleen wnl  US abdomen 5/28/2020: normal, no splenomegaly    BMBx 6/4/2020:  Normocellular marrow with multilineage hematopoiesis and megakaryocytic hyperplasia. See comment.   Increased plasma cells (6% of total cells). See comment.   Negative for increase in blasts.   Decreased storage iron.   Normal FISH and Cytogenetics  Comment   The patient's history of thrombocytopenia is noted. The bone marrow has mild atypical megakaryocytic hyperplasia without other associated features of dysplasia. The findings are nonspecific and may be seen in association with ITP; clinical exclusion of non-neoplastic causes of thrombocytopenia including nutritional deficiency, autoimmune disorders, sequestration, etc. is recommended.  The bone marrow has 6% plasma cells with kappa predominance but no definitive evidence of monoclonality. The patient's history of IgG kappa monoclonal protein is noted and the differential diagnosis includes Monoclonal Gammopathy of Undetermined Significance.    Assessment:   1) Chronic ITP  Severe.  Extensive laboratory and bone marrow evaluation have been unremarkable, leaving us with the most likely diagnosis of chronic ITP.  Positive JOSE is consistent, as is the megakaryocytic hyperplasia on bone marrow biopsy.  However, she has failed to respond even slightly to two rounds of pulse dose dexamethasone or a dose of IVIG.    Her lack of response certainly sheds some doubt on  the diagnosis, as does her worsening macrocytic anemia. I recommend repeating her bone marrow biopsy. If the results remain consistent with a diagnosis of ITP, we can try therapy with promacta. We also discussed rituximab or splenectomy, but I am not optimistic that these will help given her lack of response to IVIG or steroids. Continue to avoid NSAIDs and ASA. 2) Macrocytic Anemia  Worsening. Bone marrow suggested iron deficiency, though this would not explain the macrocytosis. Epistaxis may be contributing. She has not responded to oral iron. Repeat bone marrow biopsy as above. 3) MGUS  MARILYN with monoclonal IgG, though M-spike is negative and SFLC ratio normal.  24 hour UPEP negative. Bone marrow with 6% plasma cells, consistent with MGUS. She is at a small risk of developing myeloma in the future, so we will monitor. Repeat labs yearly, due again 9/2021. Plan:     · Bone marrow biopsy  · CBC will be checked with [facility], and I will check again in [facility] on 3/8 before her next appt with me  · Continue ferrous sulfate 325mg PO as tolerated (currently three times a week)  · Follow up with me to review results    [id number] [address] for video visits. Signed By: Christiano Rothman MD      The patient was seen by synchronous (real-time) audio-video technology. I was in the office while conducting this encounter. The patient was at her home. Consent:  She and/or her healthcare decision maker is aware that this patient-initiated Telehealth encounter is a billable service, with coverage as determined by her insurance carrier.  She is aware that she may receive a bill and has provided verbal consent to proceed: Yes    Pursuant to the emergency declaration under the [employer] and the [facility], 1135 waiver authority and the [employer] and [employer] Act, this Virtual Visit was conducted, with patient's (and/or legal guardian's) consent, to reduce the patient's risk of exposure to COVID-19 and provide necessary medical care.

## 2021-02-26 ENCOUNTER — VIRTUAL VISIT (OUTPATIENT)
Dept: ONCOLOGY | Age: 71
End: 2021-02-26
Payer: COMMERCIAL

## 2021-02-26 DIAGNOSIS — D69.6 THROMBOCYTOPENIA (HCC): ICD-10-CM

## 2021-02-26 DIAGNOSIS — D53.9 MACROCYTIC ANEMIA: ICD-10-CM

## 2021-02-26 DIAGNOSIS — D69.3 CHRONIC ITP (IDIOPATHIC THROMBOCYTOPENIA) (HCC): Primary | ICD-10-CM

## 2021-02-26 PROCEDURE — 99214 OFFICE O/P EST MOD 30 MIN: CPT | Performed by: INTERNAL MEDICINE

## 2021-02-26 NOTE — PROGRESS NOTES
Jany Lebron is a 78 y.o. female here for follow up of anemia and thrombocytopenia. 1. Have you been to the ER, urgent care clinic since your last visit? Hospitalized since your last visit? No    2. Have you seen or consulted any other health care providers outside of the [address] [place] [address] since your last visit? Include any pap smears or colon screening.  No

## 2021-03-01 ENCOUNTER — TELEPHONE (OUTPATIENT)
Dept: ONCOLOGY | Age: 71
End: 2021-03-01

## 2021-03-01 ENCOUNTER — HOSPITAL ENCOUNTER (OUTPATIENT)
Dept: INFUSION THERAPY | Age: 71
End: 2021-03-01

## 2021-03-01 NOTE — TELEPHONE ENCOUNTER
[address] at [facility]  [phone]    03/01/21- Phone call returned to patient she reported she previously received b12 injections with  but has not been seen in several months. She decided to start b12 5,000mcg at home yesterday- took her first dose yesterday and by the evening noticed a new red raised rash on her neck that spread to her back. She didn't think anything of it until after she took another dose this morning. Rash still present- she then took two benadryl's. She denies any fevers, chills, SOB ( other than her normal SOB on exertion ongoing x one year),  No chest pain, swelling, throat swelling, bleeding. She has some mild petechia on her chest but reports is it different than her rash. Advised her per  that BMBX was ordered to be done ASAP not scheduled until 3/11- would like to push that up this week. If unable she needs to have repeated labs this week to confirm the rash is not petechia all over. She should hold b 12 at this time and once rash has resolved she can try b 12 1,000mcg once daily. She will continue to monitor symptoms and if anything worsens she will call back or go to ED.    03/02/21- Per  patient is scheduled for ct biopsy Wed 03/03/21 11:00am. Patient informed.

## 2021-03-01 NOTE — TELEPHONE ENCOUNTER
Patients  called stating his wife is having a really bad rash. Please call patients  back.      # [phone]

## 2021-03-03 ENCOUNTER — HOSPITAL ENCOUNTER (OUTPATIENT)
Dept: CT IMAGING | Age: 71
Discharge: HOME | End: 2021-03-03
Attending: INTERNAL MEDICINE
Payer: COMMERCIAL

## 2021-03-03 VITALS
HEIGHT: 64 IN | WEIGHT: 136 LBS | HEART RATE: 92 BPM | DIASTOLIC BLOOD PRESSURE: 45 MMHG | BODY MASS INDEX: 23.22 KG/M2 | SYSTOLIC BLOOD PRESSURE: 98 MMHG | RESPIRATION RATE: 18 BRPM | OXYGEN SATURATION: 100 % | TEMPERATURE: 98.7 F

## 2021-03-03 DIAGNOSIS — D53.9 MACROCYTIC ANEMIA: ICD-10-CM

## 2021-03-03 DIAGNOSIS — D69.6 THROMBOCYTOPENIA (HCC): ICD-10-CM

## 2021-03-03 PROCEDURE — 88364 INSITU HYBRIDIZATION (FISH): CPT

## 2021-03-03 PROCEDURE — 36415 COLL VENOUS BLD VENIPUNCTURE: CPT

## 2021-03-03 PROCEDURE — 74011250636 HC RX REV CODE- 250/636: Performed by: RADIOLOGY

## 2021-03-03 PROCEDURE — 85025 COMPLETE CBC W/AUTO DIFF WBC: CPT

## 2021-03-03 PROCEDURE — 77030028872 HC BN BIOP NDL ON CNTRL TY TELE -C

## 2021-03-03 PROCEDURE — 88305 TISSUE EXAM BY PATHOLOGIST: CPT

## 2021-03-03 PROCEDURE — 88365 INSITU HYBRIDIZATION (FISH): CPT

## 2021-03-03 PROCEDURE — 77030003666 HC NDL SPINAL BD -A

## 2021-03-03 PROCEDURE — 88342 IMHCHEM/IMCYTCHM 1ST ANTB: CPT

## 2021-03-03 PROCEDURE — 88311 DECALCIFY TISSUE: CPT

## 2021-03-03 PROCEDURE — 99152 MOD SED SAME PHYS/QHP 5/>YRS: CPT

## 2021-03-03 PROCEDURE — 88184 FLOWCYTOMETRY/ TC 1 MARKER: CPT

## 2021-03-03 PROCEDURE — 88341 IMHCHEM/IMCYTCHM EA ADD ANTB: CPT

## 2021-03-03 PROCEDURE — 77030014115

## 2021-03-03 PROCEDURE — 88313 SPECIAL STAINS GROUP 2: CPT

## 2021-03-03 PROCEDURE — 88185 FLOWCYTOMETRY/TC ADD-ON: CPT

## 2021-03-03 RX ORDER — SODIUM CHLORIDE 9 MG/ML
50 INJECTION, SOLUTION INTRAVENOUS CONTINUOUS
Status: DISCONTINUED | OUTPATIENT
Start: 2021-03-03 | End: 2021-03-07 | Stop reason: HOSPADM

## 2021-03-03 RX ORDER — FENTANYL CITRATE 50 UG/ML
200 INJECTION, SOLUTION INTRAMUSCULAR; INTRAVENOUS
Status: DISCONTINUED | OUTPATIENT
Start: 2021-03-03 | End: 2021-03-03

## 2021-03-03 RX ORDER — MIDAZOLAM HYDROCHLORIDE 1 MG/ML
5 INJECTION INTRAMUSCULAR; INTRAVENOUS
Status: DISCONTINUED | OUTPATIENT
Start: 2021-03-03 | End: 2021-03-03

## 2021-03-03 RX ADMIN — MIDAZOLAM HYDROCHLORIDE 1 MG: 1 INJECTION, SOLUTION INTRAMUSCULAR; INTRAVENOUS at 12:01

## 2021-03-03 RX ADMIN — MIDAZOLAM HYDROCHLORIDE 1 MG: 1 INJECTION, SOLUTION INTRAMUSCULAR; INTRAVENOUS at 11:56

## 2021-03-03 RX ADMIN — MIDAZOLAM HYDROCHLORIDE 1 MG: 1 INJECTION, SOLUTION INTRAMUSCULAR; INTRAVENOUS at 11:48

## 2021-03-03 RX ADMIN — MIDAZOLAM HYDROCHLORIDE 1 MG: 1 INJECTION, SOLUTION INTRAMUSCULAR; INTRAVENOUS at 12:04

## 2021-03-03 RX ADMIN — FENTANYL CITRATE 25 MCG: 50 INJECTION, SOLUTION INTRAMUSCULAR; INTRAVENOUS at 11:56

## 2021-03-03 RX ADMIN — FENTANYL CITRATE 50 MCG: 50 INJECTION, SOLUTION INTRAMUSCULAR; INTRAVENOUS at 11:49

## 2021-03-03 RX ADMIN — MIDAZOLAM HYDROCHLORIDE 1 MG: 1 INJECTION, SOLUTION INTRAMUSCULAR; INTRAVENOUS at 11:49

## 2021-03-03 RX ADMIN — FENTANYL CITRATE 25 MCG: 50 INJECTION, SOLUTION INTRAMUSCULAR; INTRAVENOUS at 11:59

## 2021-03-03 RX ADMIN — SODIUM CHLORIDE 50 ML/HR: 9 INJECTION, SOLUTION INTRAVENOUS at 10:54

## 2021-03-03 NOTE — PROGRESS NOTES
Pt arrives ambulatory to angio department accompanied by  for CT guided bone marrow bx procedure. All assessments completed and consent was reviewed. Education given was regarding procedure, conscious sedation, post-procedure care and  management/follow-up. Opportunity for questions was provided and all questions and concerns were addressed.

## 2021-03-03 NOTE — DISCHARGE INSTRUCTIONS
BONE MARROW BIOPSY/ASPIRATION DISCHARGE INSTRUCTIONS  A bone marrow aspiration is a procedure that takes out a small amount of bone marrow fluid through a needle. A bone marrow biopsy uses a needle to take out a small amount of bone with the marrow inside it. These samples are then checked under a microscope. The hip bone is the most commonly used area for these procedures. Home Care Instructions: You may resume your regular diet and medication regimen. Do not drink alcohol, drive, or make any important legal decisions in the next 24 hours. Do not lift anything heavier than a gallon of milk until the soreness goes away. You may use over the counter acetaminophen or ibuprofen for the soreness. You may apply an ice pack to the affected area for 20-30 minutes at time for the first 24 hours. After that, you may apply a heat pack. You will have a bandaid over the area where the doctor put the needle. Keep this area clean and dry for the next 24 hours. Change the bandaid daily, or if it becomes wet, until the area has healed. Call if you have any bleeding other than a small spot on your bandaid. Call if you have any signs or symptoms of infection: fever, redness, or drainage from the puncture site or fever. Should you experience any significant changes, please call [phone] between the hours of 7:30 am and 10 pm or [phone] after hours.  After hours, ask the  to page the [address] [place] [address] Technologist, and describe the problem to the technologist.

## 2021-03-03 NOTE — ROUTINE PROCESS
Pt. Discharged to home and transported to d/c lot via w/c. Post Bone marrow bx d/c instructions reviewed with pt. and copy of the instructions given. reminded of follow up MD appointments.

## 2021-03-03 NOTE — H&P
Interventional Radiology History and Physical (Outpatient)    3/3/2021    Patient: Kristin Members 78 y.o. female     Referring Physician:  Megha Norton MD    Chief Complaint: need bm bx    History of Present Illness: itp    History:  Past Medical History:   Diagnosis Date    COPD (chronic obstructive pulmonary disease) ([facility] [address]     Fracture     Right foot fx; patient fell    IBS (irritable bowel syndrome)     Psychiatric disorder     depression, anxiety     No family history on file. Social History     Socioeconomic History    Marital status:      Spouse name: Not on file    Number of children: Not on file    Years of education: Not on file    Highest education level: Not on file   Occupational History    Not on file   Social Needs    Financial resource strain: Not on file    Food insecurity     Worry: Not on file     Inability: Not on file    Transportation needs     Medical: Not on file     Non-medical: Not on file   Tobacco Use    Smoking status: Former Smoker     Packs/day: 1.00    Smokeless tobacco: Never Used   Substance and Sexual Activity    Alcohol use: No    Drug use: Never    Sexual activity: Not on file   Lifestyle    Physical activity     Days per week: Not on file     Minutes per session: Not on file    Stress: Not on file   Relationships    Social connections     Talks on phone: Not on file     Gets together: Not on file     Attends Christian service: Not on file     Active member of club or organization: Not on file     Attends meetings of clubs or organizations: Not on file     Relationship status: Not on file    Intimate partner violence     Fear of current or ex partner: Not on file     Emotionally abused: Not on file     Physically abused: Not on file     Forced sexual activity: Not on file   Other Topics Concern    Not on file   Social History Narrative    Not on file       Allergies:    Allergies   Allergen Reactions    Morphine Hives and Itching Tolerates codeine, hydrocodone, hydromorphone    Advil [Ibuprofen] Other (comments)     BLE 4+ edema. Tolerates Aleve without BLE edema    Bactrim [Sulfamethoprim Ds] Other (comments)     Stomach burning    Keflex [Cephalexin] Diarrhea and Nausea and Vomiting     Tolerates PCNs    Macrobid [Nitrofurantoin Monohyd/M-Cryst] Diarrhea and Nausea and Vomiting    Macrodantin [Nitrofurantoin Macrocrystalline] Diarrhea and Nausea and Vomiting    Klonopin [Clonazepam] Other (comments)     Felt hung over. Stopped on Monday 6/27/2016    Sulfamethoxazole-Trimethoprim Nausea and Vomiting       Prior to Admission Medications:  Prior to Admission medications    Medication Sig Start Date End Date Taking? Authorizing Provider   ferrous sulfate 325 mg (65 mg iron) tablet Take  by mouth Daily (before breakfast). Mon, wed, fri   Yes Provider, Historical   tetracycline HCl (TETRACYCLINE PO) Take 100 mg by mouth. MWF   Yes Provider, Historical   cholecalciferol (VITAMIN D3) (1000 Units /25 mcg) tablet Take  by mouth daily. Yes Provider, Historical   Biotin 2,500 mcg cap Take  by mouth. Yes Provider, Historical   lurasidone (LATUDA) 40 mg tab tablet Take 20 mg by mouth daily (with dinner). Yes Provider, Historical   levothyroxine (SYNTHROID) 75 mcg tablet Take 75 mcg by mouth Daily (before breakfast). Yes Provider, Historical   lamoTRIgine (LAMICTAL) 150 mg tablet Take 150 mg by mouth daily. Yes Provider, Historical   LEVOMEFOLATE CALCIUM (DEPLIN PO) Take  by mouth daily. Dose unknown   Yes Provider, Historical   lovastatin (MEVACOR) 20 mg tablet Take 20 mg by mouth daily. Yes Provider, Historical   therapeutic multivitamin (THERAGRAN) tablet Take 1 Tab by mouth daily (after lunch). Yes Provider, Historical   calcium carbonate (OS-KANDI) 500 mg calcium (1,250 mg) tablet Take 1 Tab by mouth daily (after lunch).    Yes Provider, Historical   clorazepate (TRANXENE) 7.5 mg tablet Take 3.75 mg by mouth two (2) times daily as needed for Anxiety. Provider, Historical       Physical Exam:    Blood pressure 119/83, pulse 98, temperature 98.7 °F (37.1 °C), resp. rate 26, height 5' 4\" (1.626 m), weight 61.7 kg (136 lb), SpO2 98 %. General: alert, cooperative, no distress, appears stated age  Heart: rrr  Lungs: clear to auscultation bilaterally  Abdomen: soft  Neuro: grossly intact  Extremities: wnl    Plan of Care/Planned Procedure:  Risks, benefits, and alternatives reviewed with patient and she agrees to proceed with the procedure.      Syd Morgan MD

## 2021-03-04 ENCOUNTER — APPOINTMENT (OUTPATIENT)
Dept: INFUSION THERAPY | Age: 71
End: 2021-03-04

## 2021-03-05 NOTE — PROGRESS NOTES
Call from patient's primary care NP who has seen patient in follow up. Patient with c/o dyspnea with little exertion. She appears pale as well. Patient requested Vit B12 injection, this was deferred due to concern for thrombocytopenia and IM injection. Will repeat Vit B12 level as it has been a bit since last check and macrocytosis persists. Reviewed plan, we will repeat labs on Monday. Plan transfusion if Hgb has fallen further or if patient remains symptomatic of anemia. Message to front office to call patient to remind her of follow ups scheduled for next week. Bone marrow biopsy results pending.

## 2021-03-09 ENCOUNTER — VIRTUAL VISIT (OUTPATIENT)
Dept: ONCOLOGY | Age: 71
End: 2021-03-09
Payer: COMMERCIAL

## 2021-03-09 DIAGNOSIS — D75.81 MYELOFIBROSIS (HCC): Primary | ICD-10-CM

## 2021-03-09 DIAGNOSIS — D69.6 THROMBOCYTOPENIA (HCC): ICD-10-CM

## 2021-03-09 PROCEDURE — 99214 OFFICE O/P EST MOD 30 MIN: CPT | Performed by: INTERNAL MEDICINE

## 2021-03-09 NOTE — PROGRESS NOTES
[facility] [place] at [place] [address]  [facility], [facility]: [phone]  F: [phone]      Reason for Visit:   Larene Cockayne is a 78 y.o. female who is seen for follow up of anemia and thrombocytopenia. History of Present Illness:   She had a bone marrow biopsy on 3/3/2021 and presents today to review these results. She reports feeling about the same. Some BROWN which is worsening. Having to reduce her activity level. Denies bleeding, no more epistaxis. No melena. 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 Review of systems was obtained and pertinent findings reviewed above. Past medical history, social history, family history, medications, and allergies are located in the electronic medical record. Physical Exam:   There were no vitals taken for this visit. General: alert, cooperative, no distress   Mental  status: normal mood, behavior, speech, dress, motor activity, and thought processes, able to follow commands   HENT: NCAT   Neck: no visualized mass   Resp: no respiratory distress   Neuro: no gross deficits   Skin: no discoloration or lesions of concern on visible areas   Psychiatric: normal affect, consistent with stated mood, no evidence of hallucinations       Due to this being a TeleHealth evaluation (During [facility]- public health emergency), many elements of the physical examination are unable to be assessed. Evaluation of the following organ systems was limited: Vitals/Constitutional/EENT/Resp/CV/GI//MS/Neuro/Skin/Heme-Lymph-Imm. Results:     Lab Results   Component Value Date/Time    WBC 3.0 (L) 03/03/2021 10:30 AM    HGB 7.4 (L) 03/03/2021 10:30 AM    HCT 22.4 (L) 03/03/2021 10:30 AM    PLATELET 18 (LL) 58/34/6734 10:30 AM    .4 (H) 03/03/2021 10:30 AM    ABS.  NEUTROPHILS 1.9 03/03/2021 10:30 AM     Lab Results   Component Value Date/Time    Sodium 141 02/18/2021 08:53 AM    Potassium 3.6 02/18/2021 08:53 AM    Chloride 106 02/18/2021 08:53 AM    CO2 28 02/18/2021 08:53 AM    Glucose 92 02/18/2021 08:53 AM    BUN 22 (H) 02/18/2021 08:53 AM    Creatinine 1.03 (H) 02/18/2021 08:53 AM    GFR est AA >60 02/18/2021 08:53 AM    GFR est non-AA 53 (L) 02/18/2021 08:53 AM    Calcium 9.8 02/18/2021 08:53 AM     Lab Results   Component Value Date/Time    Bilirubin, total 0.3 05/20/2020 01:04 PM    ALT (SGPT) 27 05/20/2020 01:04 PM    Alk. phosphatase 79 05/20/2020 01:04 PM    Protein, total 6.8 09/03/2020 03:08 PM    Albumin 4.5 09/03/2020 03:08 PM    Globulin 3.0 05/20/2020 01:04 PM     Lab Results   Component Value Date/Time    Reticulocyte count 2.2 (H) 05/20/2020 01:04 PM    Iron % saturation 38 01/14/2021 01:50 PM    TIBC 268 01/14/2021 01:50 PM    Ferritin 131 01/14/2021 01:50 PM    Vitamin B12 820 05/20/2020 01:04 PM    Folate 16.7 05/20/2020 01:04 PM    Haptoglobin 155 05/20/2020 01:04 PM     (H) 05/20/2020 01:04 PM    TSH 0.30 (L) 06/29/2016 10:46 AM    M-Gabriele Not Observed 09/03/2020 03:08 PM    JOSE, Direct Positive (A) 05/20/2020 01:04 PM    Hep C virus Ab Interp.  NONREACTIVE 05/20/2020 01:01 PM     PLATELET   Date Value   03/03/2021 18 K/uL (LL)   02/25/2021 21 K/uL (LL)   02/22/2021 21 K/uL (LL)   02/18/2021 25 K/uL (LL)   02/18/2021 14 K/uL (LL)   02/15/2021 20 x10E3/uL (<)   02/11/2021 Comment x10E3/uL   02/08/2021 28 x10E3/uL (LL)   02/04/2021 35 x10E3/uL (LL)   01/14/2021 40 x10E3/uL (LL)   12/10/2020 50 x10E3/uL (LL)   09/03/2020 53 x10E3/uL (LL)   06/04/2020 63 K/uL (L)   05/20/2020 69 K/uL (L)   06/29/2016 177 K/uL   05/11/2010 252 K/uL       M-SPIKE  Recent Labs     09/03/20  1508 06/15/20  1145 05/20/20  1304   PE6T Not Observed Not Observed Not Observed       Free Kappa Light Chains  Recent Labs     09/03/20  1508 06/15/20  1145   KLFL1L 24.2* 23.6*       Free Lambda Light Chains  Recent Labs     09/03/20  1508 06/15/20  1145   KLFL2L 15.9 13.4       Light Chain Ratio  Recent Labs     09/03/20  1508 06/15/20  1145   KLFL3L 1.52 1.76*       UPEP M-spike  Recent Labs     09/08/20  0915   IEU8L Not Observed       8/15/2019  WBC: 5.8  HGB: 12.9  PLT: 138    9/5/2019  WBC: 5.2  HGB: 12.4  PLT: 128    5/1/2020  WBC: 5.2  HGB: 11.6  PLT: 65    5/20/2020:  Smear: Thrombocytopenia. Normochromic normocytic red blood cells. Unremarkable white blood cells. HPylori ab: negative  MARILYN: IgG monoclonal protein with kappa light chain specificity    US abdomen 7/13/2015:  Liver and spleen wnl  US abdomen 5/28/2020: normal, no splenomegaly    BMBx 6/4/2020:  Normocellular marrow with multilineage hematopoiesis and megakaryocytic hyperplasia. See comment. Increased plasma cells (6% of total cells). See comment. Negative for increase in blasts. Decreased storage iron. Normal FISH and Cytogenetics  Comment   The patient's history of thrombocytopenia is noted. The bone marrow has mild atypical megakaryocytic hyperplasia without other associated features of dysplasia. The findings are nonspecific and may be seen in association with ITP; clinical exclusion of non-neoplastic causes of thrombocytopenia including nutritional deficiency, autoimmune disorders, sequestration, etc. is recommended. The bone marrow has 6% plasma cells with kappa predominance but no definitive evidence of monoclonality. The patient's history of IgG kappa monoclonal protein is noted and the differential diagnosis includes Monoclonal Gammopathy of Undetermined Significance. BMBx 3/3/2020:  Hypercellular marrow with atypical megakaryocytes and increased reticulin fibrosis   CD34 stain shows mild increase in blasts, approximately 5%   Flow cytometry shows a small population of plasma cells with elevated kappa to lambda ratio;   otherwise no diagnostic immunophenotypic abnormalities   Comment   Overall findings are concerning for primary myelofibrosis.  Flow cytometric findings support the previous diagnosis of monoclonal gammopathy of undetermined significance (MGUS). There is insufficient sample for additional ancillary studies. Peripheral blood testing for JAK2, MPL, and CALR as well as assessment for splenomegaly is strongly recommended. Assessment:   1) Thrombocytopenia  Severe. Initial bone marrow biopsy from 6/2020 was unremarkable, leaving us with a presumptive diagnosis of ITP. However, her platelets have not budged despite two rounds of pulse dose dexamethasone or a dose of IVIG. This, along with her progressive macrocytic anemia, prompted me to repeat her bone marrow biopsy. This is now concerning for the possibility of primary myelofibrosis. I will obtain JAK2, CALR, and MPL mutation testing to further evaluate. Repeat US of the spleen to evaluate for splenomegaly, though this was normal in 5/2020. Monitor CBC weekly and transfuse PRN for PLT <10. Continue to avoid NSAIDs and ASA. Given the challenging nature of this case, I recommended referral to hematology at [facility] for further evaluation and assistance. 2) Macrocytic Anemia  Worsening. No response to oral iron. As above, possible myelofibrosis. Now with progressive BROWN which is likely related. I will set her up for a transfusion of pRBC. Continue to monitor weekly while pursuing above evaluation. 3) Leukopenia  New. As above, possible myelofibrosis. Monitor. 4) MGUS  MARILYN with monoclonal IgG, though M-spike is negative and SFLC ratio normal.  24 hour UPEP negative. Bone marrow with 6% plasma cells, consistent with MGUS. Monitor labs yearly. Plan:     · Labs this week: CBC, JAK2, CALR, MPL  · Transfuse 1u pRBC this week  · Labs weekly: CBC with diff  · US spleen  · Referral to [facility] hematology  · Return to see me in 2-3 weeks    Ok for video visits. Signed By: Jackye Heimlich, MD      The patient was seen by synchronous (real-time) audio-video technology. I was in the office while conducting this encounter.   The patient was at her home.    Consent:  She and/or her healthcare decision maker is aware that this patient-initiated Telehealth encounter is a billable service, with coverage as determined by her insurance carrier. She is aware that she may receive a bill and has provided verbal consent to proceed: Yes    Pursuant to the emergency declaration under the [address] and the [place] [address] waiver authority and the [employer] Act, this Virtual Visit was conducted, with patient's (and/or legal guardian's) consent, to reduce the patient's risk of exposure to COVID-19 and provide necessary medical care.

## 2021-03-09 NOTE — PROGRESS NOTES
Nikolai Trell is a 78 y.o. female follow up for anemia and thrombocytopenia. 1. Have you been to the ER, urgent care clinic since your last visit? Hospitalized since your last visit?no     2. Have you seen or consulted any other health care providers outside of the [address] [place] [address] since your last visit? Include any pap smears or colon screening.  no

## 2021-03-10 ENCOUNTER — TELEPHONE (OUTPATIENT)
Dept: ONCOLOGY | Age: 71
End: 2021-03-10

## 2021-03-10 NOTE — TELEPHONE ENCOUNTER
[address] at [facility]  [phone]    03/10/21- New referral placed to [facility] Hematology. Records faxed to new patient coordinator at [phone], confirmation received. 1:58 PM- Call placed to [facility] at [phone], confirmed records were received. Completed intake with new patient coordinator. Their office will contact patient to schedule an appointment. 03/16/21- New patient appointment scheduled at [facility] on 3/26 at 11 am with Dr. Arlette Dejesus. 1:52 PM- Spoke to [address] with [facility], let her know patient's bone marrow biopsy needs to be reviewed there prior to her appointment. She verbalized understanding.

## 2021-03-10 NOTE — TELEPHONE ENCOUNTER
Called patient to set up appointments and she wanted to know if it is ok that she goes to the dentist or does she need to wait.    #[phone]

## 2021-03-10 NOTE — TELEPHONE ENCOUNTER
[address] at [facility]  [phone]    03/10/21- Patient inquired about keeping her routine dental appointment or delaying for a couple months. Discussed with Patrick Suarez, due to low platelets and low WBC on recent labs, it's probably better to delay dental appointment if able to. Patient agreeable, no further questions or concerns at this time.

## 2021-03-11 ENCOUNTER — HOSPITAL ENCOUNTER (OUTPATIENT)
Dept: INFUSION THERAPY | Age: 71
Discharge: HOME | End: 2021-03-11
Payer: MEDICARE

## 2021-03-11 VITALS
SYSTOLIC BLOOD PRESSURE: 102 MMHG | OXYGEN SATURATION: 99 % | RESPIRATION RATE: 20 BRPM | DIASTOLIC BLOOD PRESSURE: 66 MMHG | HEART RATE: 109 BPM | TEMPERATURE: 96.9 F

## 2021-03-11 LAB
BASOPHILS # BLD: 0 K/UL (ref 0–0.1)
BASOPHILS NFR BLD: 0 % (ref 0–1)
DAT POLY-SP REAG RBC QL: NORMAL
DIFFERENTIAL METHOD BLD: ABNORMAL
EOSINOPHIL # BLD: 0 K/UL (ref 0–0.4)
EOSINOPHIL NFR BLD: 1 % (ref 0–7)
ERYTHROCYTE [DISTWIDTH] IN BLOOD BY AUTOMATED COUNT: 15.6 % (ref 11.5–14.5)
HCT VFR BLD AUTO: 23.3 % (ref 35–47)
HGB BLD-MCNC: 7.7 G/DL (ref 11.5–16)
IMM GRANULOCYTES # BLD AUTO: 0 K/UL (ref 0–0.04)
IMM GRANULOCYTES NFR BLD AUTO: 0 % (ref 0–0.5)
LYMPHOCYTES # BLD: 0.7 K/UL (ref 0.8–3.5)
LYMPHOCYTES NFR BLD: 25 % (ref 12–49)
MCH RBC QN AUTO: 33.5 PG (ref 26–34)
MCHC RBC AUTO-ENTMCNC: 33 G/DL (ref 30–36.5)
MCV RBC AUTO: 101.3 FL (ref 80–99)
MONOCYTES # BLD: 0.2 K/UL (ref 0–1)
MONOCYTES NFR BLD: 6 % (ref 5–13)
NEUTS SEG # BLD: 1.9 K/UL (ref 1.8–8)
NEUTS SEG NFR BLD: 68 % (ref 32–75)
NRBC # BLD: 0.03 K/UL (ref 0–0.01)
NRBC BLD-RTO: 1.1 PER 100 WBC
PLATELET # BLD AUTO: 23 K/UL (ref 150–400)
PMV BLD AUTO: ABNORMAL FL (ref 8.9–12.9)
RBC # BLD AUTO: 2.3 M/UL (ref 3.8–5.2)
RBC MORPH BLD: ABNORMAL
VIT B12 SERPL-MCNC: 901 PG/ML (ref 193–986)
WBC # BLD AUTO: 2.8 K/UL (ref 3.6–11)

## 2021-03-11 PROCEDURE — 36415 COLL VENOUS BLD VENIPUNCTURE: CPT

## 2021-03-11 PROCEDURE — 82607 VITAMIN B-12: CPT

## 2021-03-11 PROCEDURE — 82668 ASSAY OF ERYTHROPOIETIN: CPT

## 2021-03-11 PROCEDURE — 86880 COOMBS TEST DIRECT: CPT

## 2021-03-11 PROCEDURE — 86901 BLOOD TYPING SEROLOGIC RH(D): CPT

## 2021-03-11 PROCEDURE — 86920 COMPATIBILITY TEST SPIN: CPT

## 2021-03-11 PROCEDURE — 85025 COMPLETE CBC W/AUTO DIFF WBC: CPT

## 2021-03-11 RX ORDER — DIPHENHYDRAMINE HCL 25 MG
25 CAPSULE ORAL ONCE
Status: COMPLETED | OUTPATIENT
Start: 2021-03-12 | End: 2021-03-12

## 2021-03-11 RX ORDER — ACETAMINOPHEN 325 MG/1
650 TABLET ORAL ONCE
Status: COMPLETED | OUTPATIENT
Start: 2021-03-12 | End: 2021-03-12

## 2021-03-11 NOTE — PROGRESS NOTES
\[facility]\"" -     1045 - Self ambulating patient, in stable condition, presented to [facility] for lab draw. 1100 - Labs were drawn and sent for processing. Patient tolerated lab draw and voiced no complaints. Patient denies having experienced any COVID symptoms or having any known exposure to COVID      Vitals -     Temp - 96.9  BP - 102/66  O2% - 99  Pulse - 109  Resp.  - 18

## 2021-03-12 ENCOUNTER — HOSPITAL ENCOUNTER (OUTPATIENT)
Dept: INFUSION THERAPY | Age: 71
Discharge: HOME | End: 2021-03-12
Payer: MEDICARE

## 2021-03-12 VITALS
DIASTOLIC BLOOD PRESSURE: 62 MMHG | TEMPERATURE: 98.9 F | SYSTOLIC BLOOD PRESSURE: 112 MMHG | RESPIRATION RATE: 18 BRPM | HEART RATE: 93 BPM

## 2021-03-12 LAB
EPO SERPL-ACNC: 629.9 MIU/ML (ref 2.6–18.5)
HISTORY CHECKED?,CKHIST: NORMAL

## 2021-03-12 PROCEDURE — 74011250637 HC RX REV CODE- 250/637: Performed by: NURSE PRACTITIONER

## 2021-03-12 PROCEDURE — 74011250636 HC RX REV CODE- 250/636: Performed by: NURSE PRACTITIONER

## 2021-03-12 PROCEDURE — P9016 RBC LEUKOCYTES REDUCED: HCPCS

## 2021-03-12 PROCEDURE — 77030013169 SET IV BLD ICUM -A

## 2021-03-12 PROCEDURE — 36430 TRANSFUSION BLD/BLD COMPNT: CPT

## 2021-03-12 RX ORDER — SODIUM CHLORIDE 9 MG/ML
250 INJECTION, SOLUTION INTRAVENOUS AS NEEDED
Status: DISCONTINUED | OUTPATIENT
Start: 2021-03-12 | End: 2021-03-13 | Stop reason: HOSPADM

## 2021-03-12 RX ADMIN — DIPHENHYDRAMINE HYDROCHLORIDE 25 MG: 25 CAPSULE ORAL at 11:09

## 2021-03-12 RX ADMIN — SODIUM CHLORIDE 250 ML: 900 INJECTION, SOLUTION INTRAVENOUS at 11:08

## 2021-03-12 RX ADMIN — ACETAMINOPHEN 650 MG: 325 TABLET ORAL at 11:09

## 2021-03-12 NOTE — PROGRESS NOTES
[facility] Progress Note    Date: 2021    Name: Vilma Calderon    MRN: [id number]         : 1950      1107Pt arrived at [facility] Blood transfusion and in no distress for transfusion of 1 units PRBCs.  Assessment completed, no new complaints voiced.  IV established in left ac without difficulty.  Signs/symptoms of adverse blood reaction discussed with pt, voiced understanding.Patient denies any COVID contact, denies fever, sob, cough and feeling unwell    Medications received:  NS @ KVO  Tylenol 650 mg po  Benadryl 25 mg po    1125:  1st unit PRBCs started and infusing without difficulty, observed x 15 minutes  1400:  1st unit completed without adverse reaction noted, NS flushing line.  :   Patient Vitals for the past 12 hrs:   Temp Pulse Resp BP   21 1348 — 93 18 112/62   21 1325 98.9 °F (37.2 °C) 91 18 110/64   21 1225 98.9 °F (37.2 °C) 83 18 98/60   21 1155 98.9 °F (37.2 °C) 86 18 98/60   21 1140 98.2 °F (36.8 °C) (!) 52 18 (!) 87/54   21 1111 97.8 °F (36.6 °C) (!) 103 18 105/64       1405 Tolerated transfusion  well, no adverse reaction noted.  D/C instructions reviewed, copy to pt, voiced understanding.  Patient declined 1 hour post transfusion observation.  D/Cd from [facility]  and in no distress accompanied by family member.      Future Appointments   Date Time Provider Department Center   3/16/2021  8:30 AM [facility] US OP 2 [facility]   3/18/2021 11:00 AM [facility]   3/25/2021 11:30 AM [facility]   2021  9:30 AM Bud Golden MD [facility] BS AMB   2021  9:00 AM [facility] CT ER 3 [facility]         Livier Feng RN  2021

## 2021-03-13 LAB
ABO + RH BLD: NORMAL
BLD PROD TYP BPU: NORMAL
BLOOD GROUP ANTIBODIES SERPL: NORMAL
BPU ID: NORMAL
CROSSMATCH RESULT,%XM: NORMAL
SPECIMEN EXP DATE BLD: NORMAL
STATUS OF UNIT,%ST: NORMAL
UNIT DIVISION, %UDIV: 0

## 2021-03-18 ENCOUNTER — TELEPHONE (OUTPATIENT)
Dept: ONCOLOGY | Age: 71
End: 2021-03-18

## 2021-03-18 ENCOUNTER — HOSPITAL ENCOUNTER (OUTPATIENT)
Dept: INFUSION THERAPY | Age: 71
Discharge: HOME | End: 2021-03-18
Payer: MEDICARE

## 2021-03-18 VITALS
RESPIRATION RATE: 16 BRPM | DIASTOLIC BLOOD PRESSURE: 60 MMHG | SYSTOLIC BLOOD PRESSURE: 100 MMHG | HEART RATE: 94 BPM | OXYGEN SATURATION: 96 % | TEMPERATURE: 97.1 F

## 2021-03-18 LAB
BASOPHILS # BLD: 0 K/UL (ref 0–0.1)
BASOPHILS # BLD: 0 K/UL (ref 0–0.1)
BASOPHILS NFR BLD: 0 % (ref 0–1)
BASOPHILS NFR BLD: 0 % (ref 0–1)
DIFFERENTIAL METHOD BLD: ABNORMAL
DIFFERENTIAL METHOD BLD: ABNORMAL
EOSINOPHIL # BLD: 0 K/UL (ref 0–0.4)
EOSINOPHIL # BLD: 0 K/UL (ref 0–0.4)
EOSINOPHIL NFR BLD: 0 % (ref 0–7)
EOSINOPHIL NFR BLD: 1 % (ref 0–7)
ERYTHROCYTE [DISTWIDTH] IN BLOOD BY AUTOMATED COUNT: 15 % (ref 11.5–14.5)
ERYTHROCYTE [DISTWIDTH] IN BLOOD BY AUTOMATED COUNT: 15.7 % (ref 11.5–14.5)
HCT VFR BLD AUTO: 22.4 % (ref 35–47)
HCT VFR BLD AUTO: 23.3 % (ref 35–47)
HGB BLD-MCNC: 7.4 G/DL (ref 11.5–16)
HGB BLD-MCNC: 7.9 G/DL (ref 11.5–16)
IMM GRANULOCYTES # BLD AUTO: 0 K/UL
IMM GRANULOCYTES # BLD AUTO: 0 K/UL
IMM GRANULOCYTES NFR BLD AUTO: 0 %
IMM GRANULOCYTES NFR BLD AUTO: 0 %
LYMPHOCYTES # BLD: 0.9 K/UL (ref 0.8–3.5)
LYMPHOCYTES # BLD: 1 K/UL (ref 0.8–3.5)
LYMPHOCYTES NFR BLD: 29 % (ref 12–49)
LYMPHOCYTES NFR BLD: 39 % (ref 12–49)
MCH RBC QN AUTO: 33.1 PG (ref 26–34)
MCH RBC QN AUTO: 33.5 PG (ref 26–34)
MCHC RBC AUTO-ENTMCNC: 33 G/DL (ref 30–36.5)
MCHC RBC AUTO-ENTMCNC: 33.9 G/DL (ref 30–36.5)
MCV RBC AUTO: 101.4 FL (ref 80–99)
MCV RBC AUTO: 97.5 FL (ref 80–99)
METAMYELOCYTES NFR BLD MANUAL: 1 %
MONOCYTES # BLD: 0.1 K/UL (ref 0–1)
MONOCYTES # BLD: 0.2 K/UL (ref 0–1)
MONOCYTES NFR BLD: 3 % (ref 5–13)
MONOCYTES NFR BLD: 7 % (ref 5–13)
NEUTS SEG # BLD: 1.5 K/UL (ref 1.8–8)
NEUTS SEG # BLD: 1.9 K/UL (ref 1.8–8)
NEUTS SEG NFR BLD: 57 % (ref 32–75)
NEUTS SEG NFR BLD: 63 % (ref 32–75)
NRBC # BLD: 0 K/UL (ref 0–0.01)
NRBC # BLD: 0 K/UL (ref 0–0.01)
NRBC BLD-RTO: 0 PER 100 WBC
NRBC BLD-RTO: 0 PER 100 WBC
PATH REV BLD -IMP: ABNORMAL
PLATELET # BLD AUTO: 16 K/UL (ref 150–400)
PLATELET # BLD AUTO: 18 K/UL (ref 150–400)
PLATELET COMMENTS,PCOM: ABNORMAL
RBC # BLD AUTO: 2.21 M/UL (ref 3.8–5.2)
RBC # BLD AUTO: 2.39 M/UL (ref 3.8–5.2)
RBC MORPH BLD: ABNORMAL
WBC # BLD AUTO: 2.6 K/UL (ref 3.6–11)
WBC # BLD AUTO: 3 K/UL (ref 3.6–11)

## 2021-03-18 PROCEDURE — 85025 COMPLETE CBC W/AUTO DIFF WBC: CPT

## 2021-03-18 PROCEDURE — 36415 COLL VENOUS BLD VENIPUNCTURE: CPT

## 2021-03-18 NOTE — TELEPHONE ENCOUNTER
[address] at [facility]  [phone]    03/18/21Maddison Yin RN from [place] [address], called to report critical platelets 16. Dr. Patricia Messina notified.

## 2021-03-18 NOTE — PROGRESS NOTES
[facility] Lab Visit:        9925 Pt arrived ambulatory to [facility] in stable condition, for lab draw. Labs drawn peripherally from Right AC arm and sent for processing. Pt tolerated well. Visit Vitals  /60 (BP 1 Location: Left upper arm, BP Patient Position: Sitting)   Pulse 94   Temp 97.1 °F (36.2 °C)   Resp 16   SpO2 96%       1030 No new concerns voiced. D/cd home ambulatory in no distress. Pt aware of next [facility] appointment scheduled. Labs available in CC once resulted.

## 2021-03-19 ENCOUNTER — HOSPITAL ENCOUNTER (OUTPATIENT)
Dept: ULTRASOUND IMAGING | Age: 71
Discharge: HOME | End: 2021-03-19
Attending: INTERNAL MEDICINE
Payer: MEDICARE

## 2021-03-19 DIAGNOSIS — D75.81 MYELOFIBROSIS (HCC): ICD-10-CM

## 2021-03-19 PROCEDURE — 76705 ECHO EXAM OF ABDOMEN: CPT

## 2021-03-22 ENCOUNTER — TELEPHONE (OUTPATIENT)
Dept: ONCOLOGY | Age: 71
End: 2021-03-22

## 2021-03-22 ENCOUNTER — HOSPITAL ENCOUNTER (OUTPATIENT)
Dept: INFUSION THERAPY | Age: 71
Discharge: HOME | End: 2021-03-22
Payer: MEDICARE

## 2021-03-22 VITALS
TEMPERATURE: 98.3 F | OXYGEN SATURATION: 98 % | DIASTOLIC BLOOD PRESSURE: 61 MMHG | SYSTOLIC BLOOD PRESSURE: 128 MMHG | WEIGHT: 135.2 LBS | BODY MASS INDEX: 23.21 KG/M2 | HEART RATE: 99 BPM | RESPIRATION RATE: 16 BRPM

## 2021-03-22 DIAGNOSIS — D69.3 ACUTE ITP (HCC): Primary | ICD-10-CM

## 2021-03-22 LAB
BASOPHILS # BLD: 0 K/UL (ref 0–0.1)
BASOPHILS NFR BLD: 0 % (ref 0–1)
DIFFERENTIAL METHOD BLD: ABNORMAL
EOSINOPHIL # BLD: 0 K/UL (ref 0–0.4)
EOSINOPHIL NFR BLD: 1 % (ref 0–7)
ERYTHROCYTE [DISTWIDTH] IN BLOOD BY AUTOMATED COUNT: 15.3 % (ref 11.5–14.5)
HCT VFR BLD AUTO: 26.3 % (ref 35–47)
HGB BLD-MCNC: 8.7 G/DL (ref 11.5–16)
IMM GRANULOCYTES # BLD AUTO: 0 K/UL (ref 0–0.04)
IMM GRANULOCYTES NFR BLD AUTO: 0 % (ref 0–0.5)
LYMPHOCYTES # BLD: 1.2 K/UL (ref 0.8–3.5)
LYMPHOCYTES NFR BLD: 36 % (ref 12–49)
MCH RBC QN AUTO: 32.3 PG (ref 26–34)
MCHC RBC AUTO-ENTMCNC: 33.1 G/DL (ref 30–36.5)
MCV RBC AUTO: 97.8 FL (ref 80–99)
MONOCYTES # BLD: 0.3 K/UL (ref 0–1)
MONOCYTES NFR BLD: 8 % (ref 5–13)
NEUTS SEG # BLD: 1.8 K/UL (ref 1.8–8)
NEUTS SEG NFR BLD: 55 % (ref 32–75)
NRBC # BLD: 0 K/UL (ref 0–0.01)
NRBC BLD-RTO: 0 PER 100 WBC
PLATELET # BLD AUTO: 18 K/UL (ref 150–400)
RBC # BLD AUTO: 2.69 M/UL (ref 3.8–5.2)
RBC MORPH BLD: ABNORMAL
WBC # BLD AUTO: 3.3 K/UL (ref 3.6–11)

## 2021-03-22 PROCEDURE — 81338 MPL GENE COMMON VARIANTS: CPT

## 2021-03-22 PROCEDURE — 81219 CALR GENE COM VARIANTS: CPT

## 2021-03-22 PROCEDURE — 85025 COMPLETE CBC W/AUTO DIFF WBC: CPT

## 2021-03-22 PROCEDURE — 36415 COLL VENOUS BLD VENIPUNCTURE: CPT

## 2021-03-22 PROCEDURE — 81270 JAK2 GENE: CPT

## 2021-03-22 NOTE — PROGRESS NOTES
[facility] Lab Visit:    8413  Pt arrived ambulatory and in no distress, labs drawn in left AC 1 stick  per EJW . Departed [facility] ambulatory and in no distress. Visit Vitals  /61   Pulse 99   Temp 98.3 °F (36.8 °C)   Resp 16   Wt 61.3 kg (135 lb 3.2 oz)   SpO2 98%   BMI 23.21 kg/m²       Labs available in CC once resulted.

## 2021-03-22 NOTE — TELEPHONE ENCOUNTER
3/22/21 3:50 PM: Received call from Juan Carlos Velez in the outpatient infusion center with a critical platelet value of 18. Reece Skinner NP, notified. Per NP, no new orders for the patient at this time since platelet count is stable.

## 2021-03-25 ENCOUNTER — HOSPITAL ENCOUNTER (OUTPATIENT)
Dept: INFUSION THERAPY | Age: 71
Discharge: HOME | End: 2021-03-25
Payer: MEDICARE

## 2021-03-25 VITALS
SYSTOLIC BLOOD PRESSURE: 109 MMHG | TEMPERATURE: 97 F | DIASTOLIC BLOOD PRESSURE: 55 MMHG | HEART RATE: 86 BPM | OXYGEN SATURATION: 95 % | RESPIRATION RATE: 16 BRPM

## 2021-03-25 LAB
BASOPHILS # BLD: 0 K/UL (ref 0–0.1)
BASOPHILS NFR BLD: 0 % (ref 0–1)
DIFFERENTIAL METHOD BLD: ABNORMAL
EOSINOPHIL # BLD: 0.1 K/UL (ref 0–0.4)
EOSINOPHIL NFR BLD: 3 % (ref 0–7)
ERYTHROCYTE [DISTWIDTH] IN BLOOD BY AUTOMATED COUNT: 15.2 % (ref 11.5–14.5)
HCT VFR BLD AUTO: 22.9 % (ref 35–47)
HGB BLD-MCNC: 7.3 G/DL (ref 11.5–16)
IMM GRANULOCYTES # BLD AUTO: 0 K/UL
IMM GRANULOCYTES NFR BLD AUTO: 0 %
LYMPHOCYTES # BLD: 1.2 K/UL (ref 0.8–3.5)
LYMPHOCYTES NFR BLD: 43 % (ref 12–49)
MCH RBC QN AUTO: 32 PG (ref 26–34)
MCHC RBC AUTO-ENTMCNC: 31.9 G/DL (ref 30–36.5)
MCV RBC AUTO: 100.4 FL (ref 80–99)
MONOCYTES # BLD: 0.2 K/UL (ref 0–1)
MONOCYTES NFR BLD: 8 % (ref 5–13)
NEUTS SEG # BLD: 1.2 K/UL (ref 1.8–8)
NEUTS SEG NFR BLD: 46 % (ref 32–75)
NRBC # BLD: 0 K/UL (ref 0–0.01)
NRBC BLD-RTO: 0 PER 100 WBC
PATH REV BLD -IMP: ABNORMAL
PLATELET # BLD AUTO: 14 K/UL (ref 150–400)
PLATELET COMMENTS,PCOM: ABNORMAL
PMV BLD AUTO: ABNORMAL FL (ref 8.9–12.9)
RBC # BLD AUTO: 2.28 M/UL (ref 3.8–5.2)
RBC MORPH BLD: ABNORMAL
RBC MORPH BLD: ABNORMAL
WBC # BLD AUTO: 2.7 K/UL (ref 3.6–11)

## 2021-03-25 PROCEDURE — 36415 COLL VENOUS BLD VENIPUNCTURE: CPT

## 2021-03-25 PROCEDURE — 85025 COMPLETE CBC W/AUTO DIFF WBC: CPT

## 2021-03-25 NOTE — PROGRESS NOTES
[facility] Lab Visit:        5245 Pt arrived ambulatory to [facility] in stable condition, for lab draw. Labs drawn peripherally Right AC arm and sent for processing. Pt tolerated well. Visit Vitals  BP (!) 109/55 (BP 1 Location: Left upper arm, BP Patient Position: Sitting)   Pulse 86   Temp 97 °F (36.1 °C)   Resp 16   SpO2 95%      1145 No new concerns voiced. D/cd home ambulatory in no distress. Pt aware of next [facility] appointment scheduled. Labs available in CC once resulted.

## 2021-03-26 LAB
LAB DIRECTOR NAME PROVIDER: NORMAL
MPL GENE MUT TESTED BLD/T: NORMAL
MPL P.W515L+W515K+S505N BLD/T QL: NORMAL
REFERENCES, MPLM6T: NORMAL
SERVICE CMNT-IMP: NORMAL

## 2021-03-29 ENCOUNTER — TELEPHONE (OUTPATIENT)
Dept: ONCOLOGY | Age: 71
End: 2021-03-29

## 2021-03-29 LAB
BACKGROUND, CALR2T: NORMAL
BACKGROUND: 489207: NORMAL
CALR MUTATION DETECTION RESULT, CALR1T: NORMAL
DIRECTOR REVIEW: 489204: NORMAL
JAK2 P.V617F BLD/T QL: NORMAL
LAB DIRECTOR NAME PROVIDER: NORMAL
REF LAB TEST METHOD: NORMAL
REFERENCES, CALR4T: NORMAL

## 2021-03-29 NOTE — TELEPHONE ENCOUNTER
Spoke to patient to follow up after her [facility] appointment last week. It appears [facility] will be following her closely and have plans in place for close follow up. Inquired about her need to follow up here. Patient states she feels that \"[facility] has taken the ball and run with it\". She feels she is in good hands at [facility] with Dr. Mona Amaral and does not require further follow up here. She is appreciative of Dr. Cary quan. Agree with her continuing to follow with [facility]. No follow up here required. Will cancel scheduled visits here. Gave her the number for [facility] to inquire about dates/times of her appointment there. She states she is having trouble logging into their portal to access this information. She will fu here PRN.

## 2021-04-01 ENCOUNTER — APPOINTMENT (OUTPATIENT)
Dept: INFUSION THERAPY | Age: 71
End: 2021-04-01

## 2021-04-20 NOTE — PROGRESS NOTES
[facility] at [address], [address] [facility]  Raquel Mitzi: [phone]  F: [phone]      Reason for Visit:   Josh Antonio is a 78 y.o. female who is seen for follow up of anemia and thrombocytopenia. History of Present Illness:   She was treated with pulse dose dexamethasone 40mg PO daily for 4 days, completed 1/25/2021. She tolerated this ok. More recently her epistaxis has improved. No bleeding elsewhere. No petechia. Energy not great, but chronic. She is a retired nurse. Review of systems was obtained and pertinent findings reviewed above. Past medical history, social history, family history, medications, and allergies are located in the electronic medical record. Physical Exam:   There were no vitals taken for this visit. General: alert, cooperative, no distress   Mental  status: normal mood, behavior, speech, dress, motor activity, and thought processes, able to follow commands   HENT: NCAT   Neck: no visualized mass   Resp: no respiratory distress   Neuro: no gross deficits   Skin: no discoloration or lesions of concern on visible areas   Psychiatric: normal affect, consistent with stated mood, no evidence of hallucinations       Due to this being a TeleHealth evaluation (During [place] [address] public health emergency), many elements of the physical examination are unable to be assessed. Evaluation of the following organ systems was limited: Vitals/Constitutional/EENT/Resp/CV/GI//MS/Neuro/Skin/Heme-Lymph-Imm. Results:     Lab Results   Component Value Date/Time    WBC 3.3 (L) 02/11/2021 11:12 AM    HGB 8.7 (L) 02/11/2021 11:12 AM    HCT 25.5 (L) 02/11/2021 11:12 AM    PLATELET Comment 00/90/5281 11:12 AM     (H) 02/11/2021 11:12 AM    ABS.  NEUTROPHILS 1.9 02/11/2021 11:12 AM     Lab Results   Component Value Date/Time    Sodium 144 09/03/2020 03:08 PM    Potassium 4.2 09/03/2020 03:08 PM    Chloride 105 09/03/2020 03:08 PM    CO2 20 09/03/2020 03:08 PM    Glucose 113 (H) 09/03/2020 03:08 PM    BUN 13 09/03/2020 03:08 PM    Creatinine 0.89 09/03/2020 03:08 PM    GFR est AA 78 09/03/2020 03:08 PM    GFR est non-AA 67 09/03/2020 03:08 PM    Calcium 9.7 09/03/2020 03:08 PM     Lab Results   Component Value Date/Time    Bilirubin, total 0.3 05/20/2020 01:04 PM    ALT (SGPT) 27 05/20/2020 01:04 PM    Alk. phosphatase 79 05/20/2020 01:04 PM    Protein, total 6.8 09/03/2020 03:08 PM    Albumin 4.5 09/03/2020 03:08 PM    Globulin 3.0 05/20/2020 01:04 PM     Lab Results   Component Value Date/Time    Reticulocyte count 2.2 (H) 05/20/2020 01:04 PM    Iron % saturation 38 01/14/2021 01:50 PM    TIBC 268 01/14/2021 01:50 PM    Ferritin 131 01/14/2021 01:50 PM    Vitamin B12 820 05/20/2020 01:04 PM    Folate 16.7 05/20/2020 01:04 PM    Haptoglobin 155 05/20/2020 01:04 PM     (H) 05/20/2020 01:04 PM    TSH 0.30 (L) 06/29/2016 10:46 AM    M-Gabriele Not Observed 09/03/2020 03:08 PM    JOSE, Direct Positive (A) 05/20/2020 01:04 PM    Hep C virus Ab Interp.  NONREACTIVE 05/20/2020 01:01 PM     PLATELET   Date Value   02/11/2021 Comment x10E3/uL   02/08/2021 28 x10E3/uL (LL)   02/04/2021 35 x10E3/uL (LL)   01/14/2021 40 x10E3/uL (LL)   12/10/2020 50 x10E3/uL (LL)   09/03/2020 53 x10E3/uL (LL)   06/04/2020 63 K/uL (L)   05/20/2020 69 K/uL (L)   06/29/2016 177 K/uL   05/11/2010 252 K/uL       M-SPIKE  Recent Labs     09/03/20  1508 06/15/20  1145 05/20/20  1304   PE6T Not Observed Not Observed Not Observed       Free Kappa Light Chains  Recent Labs     09/03/20  1508 06/15/20  1145   KLFL1L 24.2* 23.6*       Free Lambda Light Chains  Recent Labs     09/03/20  1508 06/15/20  1145   KLFL2L 15.9 13.4       Light Chain Ratio  Recent Labs     09/03/20  1508 06/15/20  1145   KLFL3L 1.52 1.76*       UPEP M-spike  Recent Labs     09/08/20  0915   IEU8L Not Observed       8/15/2019  WBC: 5.8  HGB: 12.9  PLT: 138    9/5/2019  WBC: 5.2  HGB: 12.4  PLT: 128    5/1/2020  WBC: 5.2  HGB: 11.6  PLT: 65    5/20/2020:  Smear: Thrombocytopenia. Normochromic normocytic red blood cells. Unremarkable white blood cells. HPylori ab: negative  MARILYN: IgG monoclonal protein with kappa light chain specificity    US abdomen 7/13/2015:  Liver and spleen wnl  US abdomen 5/28/2020: normal, no splenomegaly    BMBx 6/4/2020:  Normocellular marrow with multilineage hematopoiesis and megakaryocytic hyperplasia. See comment. Increased plasma cells (6% of total cells). See comment. Negative for increase in blasts. Decreased storage iron. Normal FISH and Cytogenetics  Comment   The patient's history of thrombocytopenia is noted. The bone marrow has mild atypical megakaryocytic hyperplasia without other associated features of dysplasia. The findings are nonspecific and may be seen in association with ITP; clinical exclusion of non-neoplastic causes of thrombocytopenia including nutritional deficiency, autoimmune disorders, sequestration, etc. is recommended. The bone marrow has 6% plasma cells with kappa predominance but no definitive evidence of monoclonality. The patient's history of IgG kappa monoclonal protein is noted and the differential diagnosis includes Monoclonal Gammopathy of Undetermined Significance. Assessment:   1) Chronic ITP  Severe, with PLT falling to a low of 28k. Extensive laboratory and bone marrow evaluation have been unremarkable, leaving us with the most likely diagnosis of chronic ITP. Positive JOSE is consistent, as is the megakaryocytic hyperplasia on bone marrow biopsy. She received pulse dose dexamethasone 40mg PO daily for 4 days, completed 1/25/2021. Unfortunately, her platelets have not responded, and in fact have continued to fall. However, repeat labs from yesterday could not give an accurate number due to clumping. I recommend repeating her CBC after the weekend, and checking PLT on citrate tube at that time as well.   If still not responding, I will repeat a course of pulse dose steroids, but also administer IVIG, and monitor her platelets closely for any response. If no response at all, it certainly may shed some doubt on the diagnosis and we may need to repeat her bone marrow biopsy. Continue to avoid NSAIDs and ASA. 2) Macrocytic Anemia  Worsening. Bone marrow suggests iron deficiency, though this would not explain the macrocytosis. Epistaxis may be contributing. Continue oral iron, which she recently increased to TIW. May need to consider repeat bone marrow biopsy. 3) MGUS  MARILYN with monoclonal IgG, though M-spike is negative and SFLC ratio normal.  24 hour UPEP negative. Bone marrow with 6% plasma cells, consistent with MGUS. She is at a small risk of developing myeloma in the future, so we will monitor. Repeat labs yearly, due again 9/2021. Plan:     · Repeat CBC on Monday  · Check PLT on Citrate tube, given clumping  · If no improvement, will plan on the following:  · IVIG 1gram/kg in [facility]  · Repeat dexamethasone 40mg PO daily x4 days, to begin on day of IVIG  · Continue ferrous sulfate 325mg PO as tolerated (currently three times a week)  · Follow up by phone after labs next week    [id number] [address]ona for video visits. Signed By: Casi Farnsworth MD      The patient was seen by synchronous (real-time) audio-video technology. I was in the office while conducting this encounter. The patient was at her home. Consent:  She and/or her healthcare decision maker is aware that this patient-initiated Telehealth encounter is a billable service, with coverage as determined by her insurance carrier.  She is aware that she may receive a bill and has provided verbal consent to proceed: Yes    Pursuant to the emergency declaration under the [address] and the [facility] [address] waiver authority and the [employer] Act, this Virtual Visit was conducted, with patient's (and/or legal guardian's) consent, to reduce the patient's risk of exposure to COVID-19 and provide necessary medical care. no

## 2021-08-23 ENCOUNTER — TRANSCRIBE ORDER (OUTPATIENT)
Dept: SCHEDULING | Age: 71
End: 2021-08-23

## 2021-08-23 DIAGNOSIS — L40.3 SAPHO SYNDROME (HCC): ICD-10-CM

## 2021-08-23 DIAGNOSIS — M65.9 SAPHO SYNDROME (HCC): ICD-10-CM

## 2021-08-23 DIAGNOSIS — M85.80 SAPHO SYNDROME (HCC): ICD-10-CM

## 2021-08-23 DIAGNOSIS — D75.81 AGNOGENIC MYELOID METAPLASIA (HCC): ICD-10-CM

## 2021-08-23 DIAGNOSIS — M54.50 LUMBAGO: ICD-10-CM

## 2021-08-23 DIAGNOSIS — L70.9 SAPHO SYNDROME (HCC): ICD-10-CM

## 2021-08-23 DIAGNOSIS — M51.9 INTERVERTEBRAL DISC DISORDER: Primary | ICD-10-CM

## 2021-08-23 DIAGNOSIS — M86.9 SAPHO SYNDROME (HCC): ICD-10-CM

## 2021-09-02 ENCOUNTER — HOSPITAL ENCOUNTER (OUTPATIENT)
Dept: MRI IMAGING | Age: 71
Discharge: HOME | End: 2021-09-02
Payer: MEDICARE

## 2021-09-02 DIAGNOSIS — M51.9 INTERVERTEBRAL DISC DISORDER: ICD-10-CM

## 2021-09-02 DIAGNOSIS — M85.80 SAPHO SYNDROME (HCC): ICD-10-CM

## 2021-09-02 DIAGNOSIS — L70.9 SAPHO SYNDROME (HCC): ICD-10-CM

## 2021-09-02 DIAGNOSIS — M54.50 LUMBAGO: ICD-10-CM

## 2021-09-02 DIAGNOSIS — M86.9 SAPHO SYNDROME (HCC): ICD-10-CM

## 2021-09-02 DIAGNOSIS — M65.9 SAPHO SYNDROME (HCC): ICD-10-CM

## 2021-09-02 DIAGNOSIS — D75.81 AGNOGENIC MYELOID METAPLASIA (HCC): ICD-10-CM

## 2021-09-02 DIAGNOSIS — L40.3 SAPHO SYNDROME (HCC): ICD-10-CM

## 2021-09-02 PROCEDURE — 72148 MRI LUMBAR SPINE W/O DYE: CPT

## 2021-10-31 ENCOUNTER — APPOINTMENT (OUTPATIENT)
Dept: GENERAL RADIOLOGY | Age: 71
End: 2021-10-31
Attending: EMERGENCY MEDICINE
Payer: MEDICARE

## 2021-10-31 ENCOUNTER — HOSPITAL ENCOUNTER (EMERGENCY)
Age: 71
Discharge: HOME | End: 2021-11-01
Attending: EMERGENCY MEDICINE
Payer: MEDICARE

## 2021-10-31 DIAGNOSIS — M54.50 CHRONIC MIDLINE LOW BACK PAIN WITHOUT SCIATICA: Primary | ICD-10-CM

## 2021-10-31 DIAGNOSIS — G89.29 CHRONIC MIDLINE LOW BACK PAIN WITHOUT SCIATICA: Primary | ICD-10-CM

## 2021-10-31 LAB
ALBUMIN SERPL-MCNC: 3.7 G/DL (ref 3.5–5)
ALBUMIN/GLOB SERPL: 0.8 {RATIO} (ref 1.1–2.2)
ALP SERPL-CCNC: 105 U/L (ref 45–117)
ALT SERPL-CCNC: 24 U/L (ref 12–78)
ANION GAP SERPL CALC-SCNC: 13 MMOL/L (ref 5–15)
AST SERPL-CCNC: 29 U/L (ref 15–37)
BILIRUB SERPL-MCNC: 0.5 MG/DL (ref 0.2–1)
BUN SERPL-MCNC: 17 MG/DL (ref 6–20)
BUN/CREAT SERPL: 15 (ref 12–20)
CALCIUM SERPL-MCNC: 9.5 MG/DL (ref 8.5–10.1)
CHLORIDE SERPL-SCNC: 103 MMOL/L (ref 97–108)
CO2 SERPL-SCNC: 25 MMOL/L (ref 21–32)
CREAT SERPL-MCNC: 1.11 MG/DL (ref 0.55–1.02)
GLOBULIN SER CALC-MCNC: 4.7 G/DL (ref 2–4)
GLUCOSE SERPL-MCNC: 126 MG/DL (ref 65–100)
POTASSIUM SERPL-SCNC: 3.9 MMOL/L (ref 3.5–5.1)
PROT SERPL-MCNC: 8.4 G/DL (ref 6.4–8.2)
SODIUM SERPL-SCNC: 141 MMOL/L (ref 136–145)

## 2021-10-31 PROCEDURE — 80053 COMPREHEN METABOLIC PANEL: CPT

## 2021-10-31 PROCEDURE — 71045 X-RAY EXAM CHEST 1 VIEW: CPT

## 2021-10-31 PROCEDURE — 99284 EMERGENCY DEPT VISIT MOD MDM: CPT

## 2021-10-31 PROCEDURE — 99285 EMERGENCY DEPT VISIT HI MDM: CPT

## 2021-10-31 PROCEDURE — 96361 HYDRATE IV INFUSION ADD-ON: CPT

## 2021-10-31 PROCEDURE — 96360 HYDRATION IV INFUSION INIT: CPT

## 2021-10-31 PROCEDURE — 74011250636 HC RX REV CODE- 250/636: Performed by: EMERGENCY MEDICINE

## 2021-10-31 PROCEDURE — 83605 ASSAY OF LACTIC ACID: CPT

## 2021-10-31 PROCEDURE — 85025 COMPLETE CBC W/AUTO DIFF WBC: CPT

## 2021-10-31 RX ORDER — SODIUM CHLORIDE 9 MG/ML
1000 INJECTION, SOLUTION INTRAVENOUS CONTINUOUS
Status: DISCONTINUED | OUTPATIENT
Start: 2021-11-01 | End: 2021-11-01 | Stop reason: HOSPADM

## 2021-10-31 RX ADMIN — SODIUM CHLORIDE 1000 ML: 9 INJECTION, SOLUTION INTRAVENOUS at 23:16

## 2021-11-01 VITALS
HEART RATE: 103 BPM | RESPIRATION RATE: 22 BRPM | OXYGEN SATURATION: 100 % | TEMPERATURE: 99.5 F | DIASTOLIC BLOOD PRESSURE: 63 MMHG | SYSTOLIC BLOOD PRESSURE: 110 MMHG

## 2021-11-01 VITALS
SYSTOLIC BLOOD PRESSURE: 135 MMHG | RESPIRATION RATE: 18 BRPM | TEMPERATURE: 99.7 F | HEART RATE: 100 BPM | DIASTOLIC BLOOD PRESSURE: 70 MMHG | OXYGEN SATURATION: 94 %

## 2021-11-01 DIAGNOSIS — M54.50 CHRONIC BILATERAL LOW BACK PAIN WITHOUT SCIATICA: Primary | ICD-10-CM

## 2021-11-01 DIAGNOSIS — G89.29 CHRONIC BILATERAL LOW BACK PAIN WITHOUT SCIATICA: Primary | ICD-10-CM

## 2021-11-01 LAB
APPEARANCE UR: CLEAR
BACTERIA URNS QL MICRO: NEGATIVE /HPF
BASOPHILS # BLD: 0 K/UL (ref 0–0.1)
BASOPHILS NFR BLD: 0 % (ref 0–1)
BILIRUB UR QL: NEGATIVE
COLOR UR: YELLOW
DIFFERENTIAL METHOD BLD: ABNORMAL
EOSINOPHIL # BLD: 0 K/UL (ref 0–0.4)
EOSINOPHIL NFR BLD: 0 % (ref 0–7)
EPITH CASTS URNS QL MICRO: ABNORMAL /LPF
ERYTHROCYTE [DISTWIDTH] IN BLOOD BY AUTOMATED COUNT: 16 % (ref 11.5–14.5)
GLUCOSE UR STRIP.AUTO-MCNC: NEGATIVE MG/DL
HCT VFR BLD AUTO: 28.8 % (ref 35–47)
HGB BLD-MCNC: 9.4 G/DL (ref 11.5–16)
HGB UR QL STRIP: NEGATIVE
IMM GRANULOCYTES # BLD AUTO: 0 K/UL
IMM GRANULOCYTES NFR BLD AUTO: 0 %
KETONES UR QL STRIP.AUTO: ABNORMAL MG/DL
LACTATE SERPL-SCNC: 1.2 MMOL/L (ref 0.4–2)
LACTATE SERPL-SCNC: 3.6 MMOL/L (ref 0.4–2)
LEUKOCYTE ESTERASE UR QL STRIP.AUTO: NEGATIVE
LYMPHOCYTES # BLD: 1.4 K/UL (ref 0.8–3.5)
LYMPHOCYTES NFR BLD: 35 % (ref 12–49)
MCH RBC QN AUTO: 30.1 PG (ref 26–34)
MCHC RBC AUTO-ENTMCNC: 32.6 G/DL (ref 30–36.5)
MCV RBC AUTO: 92.3 FL (ref 80–99)
METAMYELOCYTES NFR BLD MANUAL: 3 %
MONOCYTES # BLD: 0.3 K/UL (ref 0–1)
MONOCYTES NFR BLD: 7 % (ref 5–13)
MYELOCYTES NFR BLD MANUAL: 2 %
NEUTS BAND NFR BLD MANUAL: 1 % (ref 0–6)
NEUTS SEG # BLD: 2.1 K/UL (ref 1.8–8)
NEUTS SEG NFR BLD: 52 % (ref 32–75)
NITRITE UR QL STRIP.AUTO: NEGATIVE
NRBC # BLD: 0.18 K/UL (ref 0–0.01)
NRBC BLD-RTO: 4.5 PER 100 WBC
PH UR STRIP: 5 [PH] (ref 5–8)
PLATELET # BLD AUTO: 33 K/UL (ref 150–400)
PMV BLD AUTO: 11.2 FL (ref 8.9–12.9)
PROT UR STRIP-MCNC: NEGATIVE MG/DL
RBC # BLD AUTO: 3.12 M/UL (ref 3.8–5.2)
RBC #/AREA URNS HPF: ABNORMAL /HPF (ref 0–5)
RBC MORPH BLD: ABNORMAL
SP GR UR REFRACTOMETRY: 1.02 (ref 1–1.03)
UROBILINOGEN UR QL STRIP.AUTO: 0.2 EU/DL (ref 0.2–1)
WBC # BLD AUTO: 4 K/UL (ref 3.6–11)
WBC URNS QL MICRO: ABNORMAL /HPF (ref 0–4)

## 2021-11-01 PROCEDURE — 74011250637 HC RX REV CODE- 250/637: Performed by: EMERGENCY MEDICINE

## 2021-11-01 PROCEDURE — 81001 URINALYSIS AUTO W/SCOPE: CPT

## 2021-11-01 PROCEDURE — 99282 EMERGENCY DEPT VISIT SF MDM: CPT

## 2021-11-01 PROCEDURE — 83605 ASSAY OF LACTIC ACID: CPT

## 2021-11-01 PROCEDURE — 74011250636 HC RX REV CODE- 250/636: Performed by: EMERGENCY MEDICINE

## 2021-11-01 PROCEDURE — 96361 HYDRATE IV INFUSION ADD-ON: CPT

## 2021-11-01 RX ORDER — OXYCODONE HYDROCHLORIDE 5 MG/1
5 TABLET ORAL ONCE
Status: COMPLETED | OUTPATIENT
Start: 2021-11-01 | End: 2021-11-01

## 2021-11-01 RX ORDER — LIDOCAINE 4 G/100G
3 PATCH TOPICAL EVERY 24 HOURS
Status: DISCONTINUED | OUTPATIENT
Start: 2021-11-01 | End: 2021-11-01

## 2021-11-01 RX ADMIN — SODIUM CHLORIDE 1000 ML: 9 INJECTION, SOLUTION INTRAVENOUS at 00:53

## 2021-11-01 RX ADMIN — OXYCODONE 5 MG: 5 TABLET ORAL at 00:52

## 2021-11-01 NOTE — DISCHARGE INSTRUCTIONS
Is no infection noted on your chest x-ray or urine today. Please monitor your symptoms carefully, and if you begin having fevers, chills please return to the ER immediately. Thank you.

## 2021-11-01 NOTE — ED NOTES
Patient given discharge papers and instructions by primary RN. Patient verbalized understanding and stated not having questions or concerns regarding her care. Patient ambulatory out of ED with family.

## 2021-11-02 NOTE — ED PROVIDER NOTES
HPI     61-year-old female with a history of COPD, irritable bowel, depression and anxiety, myelodysplastic syndrome with chronic back pain, presents the emergency department with \"flareup\" of her chronic back pain. She states it started yesterday when she was at [facility] emergency center with her  who was a patient at the time. She was given her normal chronic pain medication and felt better. She states there is nothing different about the pain today than her usual flareups. She states she usually gets very anxious jittery low-grade fevers when these events occur. She denies any cough congestion shortness of breath abdominal pain nausea vomiting or any urinary symptoms. She has been vaccinated for Covid. She states she took her long-acting doing oxycodone and her breakthrough Percocet 1 hour prior to coming in she now has her baseline 3 /10 pain. . Patient states the pain usually radiates into her groin and into her legs bilaterally. She denies any falls or injuries. She states she think she is been doing too much activity recently which flared her symptoms. She denies any weakness or numbness in her legs, denies any difficulty with urination specifically no retention and she denies any numbness in the genital area. Past Medical History:   Diagnosis Date    COPD (chronic obstructive pulmonary disease) ([facility] [address]     Fracture     Right foot fx; patient fell    IBS (irritable bowel syndrome)     Psychiatric disorder     depression, anxiety       Past Surgical History:   Procedure Laterality Date    HX CHOLECYSTECTOMY      HX GYN      hysterectomy         History reviewed. No pertinent family history.     Social History     Socioeconomic History    Marital status:      Spouse name: Not on file    Number of children: Not on file    Years of education: Not on file    Highest education level: Not on file   Occupational History    Not on file   Tobacco Use    Smoking status: Former Smoker Packs/day: 1.00    Smokeless tobacco: Never Used   Vaping Use    Vaping Use: Never used   Substance and Sexual Activity    Alcohol use: No    Drug use: Never    Sexual activity: Not on file   Other Topics Concern    Not on file   Social History Narrative    Not on file     Social Determinants of Health     Financial Resource Strain:     Difficulty of Paying Living Expenses:    Food Insecurity:     Worried About Running Out of Food in the Last Year:     [address] in the Last Year:    Transportation Needs:     Lack of Transportation (Medical):  Lack of Transportation (Non-Medical):    Physical Activity:     Days of Exercise per Week:     Minutes of Exercise per Session:    Stress:     Feeling of Stress :    Social Connections:     Frequency of Communication with Friends and Family:     Frequency of Social Gatherings with Friends and Family:     Attends Judaism Services:     Active Member of Clubs or Organizations:     Attends Club or Organization Meetings:     Marital Status:    Intimate Partner Violence:     Fear of Current or Ex-Partner:     Emotionally Abused:     Physically Abused:     Sexually Abused: ALLERGIES: Morphine, Advil [ibuprofen], Bactrim [sulfamethoprim ds], Keflex [cephalexin], Macrobid [nitrofurantoin monohyd/m-cryst], Macrodantin [nitrofurantoin macrocrystalline], Klonopin [clonazepam], and Sulfamethoxazole-trimethoprim    Review of Systems   Constitutional: Negative for fever. HENT: Negative for congestion. Eyes: Negative for visual disturbance. Respiratory: Negative for cough and shortness of breath. Cardiovascular: Negative for chest pain. Gastrointestinal: Negative for abdominal pain, nausea and vomiting. Endocrine: Negative for polyuria. Genitourinary: Negative for dysuria. Musculoskeletal: Positive for back pain. Negative for gait problem. Skin: Negative for rash. Neurological: Negative for weakness, numbness and headaches. Psychiatric/Behavioral: Negative for dysphoric mood. Vitals:    11/01/21 2121 11/01/21 2310   BP: 135/70    Pulse: (!) 117 100   Resp: 18    Temp: 99.7 °F (37.6 °C)    SpO2: 94%             Physical Exam  Constitutional:       General: She is not in acute distress. Appearance: She is well-developed. HENT:      Head: Normocephalic and atraumatic. Mouth/Throat:      Pharynx: No oropharyngeal exudate. Eyes:      General: No scleral icterus. Right eye: No discharge. Left eye: No discharge. Pupils: Pupils are equal, round, and reactive to light. Neck:      Vascular: No JVD. Cardiovascular:      Rate and Rhythm: Normal rate and regular rhythm. Heart sounds: Normal heart sounds. No murmur heard. Pulmonary:      Effort: Pulmonary effort is normal. No respiratory distress. Breath sounds: Normal breath sounds. No stridor. No wheezing or rales. Chest:      Chest wall: No tenderness. Abdominal:      General: Bowel sounds are normal. There is no distension. Palpations: Abdomen is soft. There is no mass. Tenderness: There is no abdominal tenderness. There is no guarding or rebound. Musculoskeletal:         General: Normal range of motion. Cervical back: Normal range of motion and neck supple. Skin:     General: Skin is warm and dry. Capillary Refill: Capillary refill takes less than 2 seconds. Findings: No rash. Neurological:      Mental Status: She is oriented to person, place, and time. Comments: 5/5 hip, knee, ankle, great toe flex/exten,  Sensation intact bilaterally  Symmetric 1+ patellar reflexes bilaterally   Psychiatric:         Behavior: Behavior normal.         Thought Content: Thought content normal.         Judgment: Judgment normal.          MDM       Procedures    Very reassuring neurologic exam. Patient does not want any further work-up in the ED and just wants to go home as her pain is significantly improved.  She does not feel she needs any imaging. Her pain management doctors at [facility] and she feels comfortable calling her in the morning to get recommendations on pain management. Patient was instructed to return should her symptoms/condition worsen in any way. I recommended she try either taking her 5 mg oxycodone every 4 hours or taking 1-1/2 tablets every 6 hours until she can talk to her primary care doctor, if needed.

## 2021-11-02 NOTE — ED TRIAGE NOTES
Pt has a Hx of terminal bone cancer. Pt reports she was in the ER last night and had a flare up of her back pain. Pt reports her pain has not gotten better.

## 2021-11-02 NOTE — DISCHARGE INSTRUCTIONS
Please call your cancer doctor in the morning to get recommendations on adjusting your pain medication. For now you can either take your 5 mg oxycodone every 4 hours, or take 1-1/2 to 2 tablets every 6 hours for breakthrough pain.     If you feel your symptoms/condition are acutely worsening in any way return to the emergency department

## 2022-03-19 PROBLEM — D69.3 ACUTE ITP (HCC): Status: ACTIVE | Noted: 2021-02-16

## 2023-03-05 NOTE — ED PROVIDER NOTES
Patient is a 53-year-old female with history of COPD, irritable bowel syndrome, depression, anxiety,, myelodysplastic syndrome followed by oncology who presents with acute exacerbation of chronic lower back pain, shaking, chills, shortness of breath. Patient reports that when her back pain except these are symptoms that she experiences. Was in the ER with her  who is a patient, when she began having symptoms and checked herself into be evaluated. Denies any fevers at home no cough, nausea, vomiting, abdominal pain, chest pain. Past Medical History:   Diagnosis Date    COPD (chronic obstructive pulmonary disease) ([address]     Fracture     Right foot fx; patient fell    IBS (irritable bowel syndrome)     Psychiatric disorder     depression, anxiety       Past Surgical History:   Procedure Laterality Date    HX CHOLECYSTECTOMY      HX GYN      hysterectomy         History reviewed. No pertinent family history. Social History     Socioeconomic History    Marital status:      Spouse name: Not on file    Number of children: Not on file    Years of education: Not on file    Highest education level: Not on file   Occupational History    Not on file   Tobacco Use    Smoking status: Former Smoker     Packs/day: 1.00    Smokeless tobacco: Never Used   Vaping Use    Vaping Use: Never used   Substance and Sexual Activity    Alcohol use: No    Drug use: Never    Sexual activity: Not on file   Other Topics Concern    Not on file   Social History Narrative    Not on file     Social Determinants of Health     Financial Resource Strain:     Difficulty of Paying Living Expenses:    Food Insecurity:     Worried About [address] in the Last Year:     [address] in the Last Year:    Transportation Needs:     Lack of Transportation (Medical):      Lack of Transportation (Non-Medical):    Physical Activity:     Days of Exercise per Week:     Minutes of Exercise per Session: Stress:     Feeling of Stress :    Social Connections:     Frequency of Communication with Friends and Family:     Frequency of Social Gatherings with Friends and Family:     Attends Buddhist Services:     Active Member of Clubs or Organizations:     Attends Club or Organization Meetings:     Marital Status:    Intimate Partner Violence:     Fear of Current or Ex-Partner:     Emotionally Abused:     Physically Abused:     Sexually Abused: ALLERGIES: Morphine, Advil [ibuprofen], Bactrim [sulfamethoprim ds], Keflex [cephalexin], Macrobid [nitrofurantoin monohyd/m-cryst], Macrodantin [nitrofurantoin macrocrystalline], Klonopin [clonazepam], and Sulfamethoxazole-trimethoprim    Review of Systems   Constitutional: Positive for chills. Negative for fever. HENT: Negative for drooling and nosebleeds. Eyes: Negative for pain and itching. Respiratory: Positive for shortness of breath. Negative for choking and stridor. Cardiovascular: Negative for leg swelling. Gastrointestinal: Negative for abdominal distention and rectal pain. Endocrine: Negative for heat intolerance and polyphagia. Genitourinary: Positive for pelvic pain. Negative for enuresis and genital sores. Musculoskeletal: Positive for back pain. Negative for arthralgias and joint swelling. Skin: Negative for color change. Allergic/Immunologic: Negative for immunocompromised state. Neurological: Negative for tremors and speech difficulty. Hematological: Negative for adenopathy. Psychiatric/Behavioral: Negative for dysphoric mood and sleep disturbance. Vitals:    10/31/21 2315 10/31/21 2330 11/01/21 0157 11/01/21 0216   BP: (!) 115/55 110/63     Pulse:    (!) 103   Resp:       Temp:   99.5 °F (37.5 °C)    SpO2: 100% 100%              Physical Exam  Vitals and nursing note reviewed. Constitutional:       General: She is in acute distress. Appearance: She is well-developed.  She is not toxic-appearing or diaphoretic. HENT:      Head: Normocephalic. Nose: Nose normal.   Eyes:      Conjunctiva/sclera: Conjunctivae normal.   Cardiovascular:      Rate and Rhythm: Regular rhythm. Tachycardia present. Heart sounds: Normal heart sounds. Pulmonary:      Effort: Pulmonary effort is normal. No respiratory distress. Breath sounds: Normal breath sounds. Abdominal:      General: There is no distension. Palpations: Abdomen is soft. Tenderness: There is no abdominal tenderness. Musculoskeletal:         General: No deformity. Normal range of motion. Cervical back: Normal range of motion and neck supple. Skin:     General: Skin is warm and dry. Neurological:      Mental Status: She is alert and oriented to person, place, and time. Coordination: Coordination normal.   Psychiatric:         Behavior: Behavior normal.          MDM  Number of Diagnoses or Management Options  Chronic midline low back pain without sciatica  Diagnosis management comments: Time of reevaluation, patient looks completely improved. Work-up negative for evidence of occult infection, after IV fluids lactate within normal limits. Patient does not want to stay in the hospital, and has follow-up with PCP in the morning. Discussed blood work with her, and she reports port platelet and hemoglobin levels have improved since they were last checked. Denies any abnormal bleeding. Will discharge with close PCP follow-up. Amount and/or Complexity of Data Reviewed  Decide to obtain previous medical records or to obtain history from someone other than the patient: yes      ED Course as of Nov 01 0653   Mon Nov 01, 2021   0216 Patient reports that the episode that occurred today leading to her checking in to the ER as the patient when she was originally here with her  is an acute exacerbation of chronic symptoms.   Patient reports that when she normally has pelvic pain she starts having chills, shortness of breath. She reports now she feels better, has no pain, shortness of breath. Would like to be discharged home. Aware of her thrombocytopenia, which she reports is better than it has been in the past.  Denies any abnormal bleeding. Stable for discharge. [AL]      ED Course User Index  [AL] Emely Guadrado MD       Procedures    Patient's results have been reviewed with them. Patient and/or family have verbally conveyed their understanding and agreement of the patient's signs, symptoms, diagnosis, treatment and prognosis and additionally agree to follow up as recommended or return to the Emergency Room should their condition change prior to follow-up. Discharge instructions have also been provided to the patient with some educational information regarding their diagnosis as well a list of reasons why they would want to return to the ER prior to their follow-up appointment should their condition change. [None known] : None known [FreeTextEntry2] : hard worker, dedicated, insightful [Yes (select details below)] : Have you ever experienced this type of event? Yes [FreeTextEntry3] : limited socialization due to work stressors [Competitive and integrated employment] : Competitive and integrated employment [35 hours or more] : 35 hours or more [Financially stable] : financially stable [FreeTextEntry1] : works excessive hours

## 2023-05-10 RX ORDER — CLORAZEPATE DIPOTASSIUM 7.5 MG/1
TABLET ORAL 2 TIMES DAILY PRN
COMMUNITY

## 2023-05-10 RX ORDER — IBUPROFEN 200 MG
1 CAPSULE ORAL
COMMUNITY

## 2023-05-10 RX ORDER — LOVASTATIN 20 MG/1
20 TABLET ORAL DAILY
COMMUNITY

## 2023-05-10 RX ORDER — FERROUS SULFATE 325(65) MG
TABLET ORAL
COMMUNITY

## 2023-05-10 RX ORDER — LAMOTRIGINE 150 MG/1
150 TABLET ORAL DAILY
COMMUNITY

## 2023-05-10 RX ORDER — LEVOTHYROXINE SODIUM 0.07 MG/1
TABLET ORAL
COMMUNITY

## 2023-05-10 RX ORDER — LURASIDONE HYDROCHLORIDE 40 MG/1
TABLET, FILM COATED ORAL
COMMUNITY